# Patient Record
Sex: FEMALE | URBAN - METROPOLITAN AREA
[De-identification: names, ages, dates, MRNs, and addresses within clinical notes are randomized per-mention and may not be internally consistent; named-entity substitution may affect disease eponyms.]

---

## 2018-08-07 PROBLEM — M10.9 ACUTE GOUT INVOLVING TOE OF LEFT FOOT: Status: ACTIVE | Noted: 2018-08-07

## 2021-07-20 ENCOUNTER — HOSPITAL ENCOUNTER (OUTPATIENT)
Age: 76
Setting detail: SPECIMEN
Discharge: HOME | End: 2021-07-20
Payer: COMMERCIAL

## 2021-07-20 LAB
ANION GAP SERPL CALCULATED.3IONS-SCNC: 13 MMOL/L (ref 9–17)
BUN BLDV-MCNC: 16 MG/DL (ref 8–23)
BUN/CREAT BLD: ABNORMAL (ref 9–20)
CALCIUM SERPL-MCNC: 9.9 MG/DL (ref 8.6–10.4)
CHLORIDE BLD-SCNC: 100 MMOL/L (ref 98–107)
CO2: 24 MMOL/L (ref 20–31)
CREAT SERPL-MCNC: 0.77 MG/DL (ref 0.5–0.9)
GFR AFRICAN AMERICAN: >60 ML/MIN
GFR NON-AFRICAN AMERICAN: >60 ML/MIN
GFR SERPL CREATININE-BSD FRML MDRD: ABNORMAL ML/MIN/{1.73_M2}
GFR SERPL CREATININE-BSD FRML MDRD: ABNORMAL ML/MIN/{1.73_M2}
GLUCOSE BLD-MCNC: 134 MG/DL (ref 70–99)
HCT VFR BLD CALC: 39.8 % (ref 36.3–47.1)
HEMOGLOBIN: 12.9 G/DL (ref 11.9–15.1)
MCH RBC QN AUTO: 29.5 PG (ref 25.2–33.5)
MCHC RBC AUTO-ENTMCNC: 32.4 G/DL (ref 28.4–34.8)
MCV RBC AUTO: 90.9 FL (ref 82.6–102.9)
NRBC AUTOMATED: 0 PER 100 WBC
PDW BLD-RTO: 13.9 % (ref 11.8–14.4)
PLATELET # BLD: 267 K/UL (ref 138–453)
PMV BLD AUTO: 12.5 FL (ref 8.1–13.5)
POTASSIUM SERPL-SCNC: 3.8 MMOL/L (ref 3.7–5.3)
RBC # BLD: 4.38 M/UL (ref 3.95–5.11)
SODIUM BLD-SCNC: 137 MMOL/L (ref 135–144)
WBC # BLD: 8.5 K/UL (ref 3.5–11.3)

## 2021-07-20 PROCEDURE — 81003 URINALYSIS AUTO W/O SCOPE: CPT

## 2021-07-20 PROCEDURE — 80048 BASIC METABOLIC PNL TOTAL CA: CPT

## 2021-07-20 PROCEDURE — 85027 COMPLETE CBC AUTOMATED: CPT

## 2021-07-20 PROCEDURE — 87086 URINE CULTURE/COLONY COUNT: CPT

## 2021-07-20 PROCEDURE — P9603 ONE-WAY ALLOW PRORATED MILES: HCPCS

## 2021-07-20 PROCEDURE — 36415 COLL VENOUS BLD VENIPUNCTURE: CPT

## 2021-07-21 LAB
BILIRUBIN URINE: NEGATIVE
COLOR: YELLOW
COMMENT UA: NORMAL
GLUCOSE URINE: NEGATIVE
KETONES, URINE: NEGATIVE
LEUKOCYTE ESTERASE, URINE: NEGATIVE
NITRITE, URINE: NEGATIVE
PH UA: 6.5 (ref 5–8)
PROTEIN UA: NEGATIVE
SPECIFIC GRAVITY UA: 1.01 (ref 1–1.03)
TURBIDITY: CLEAR
URINE HGB: NEGATIVE
UROBILINOGEN, URINE: NORMAL

## 2021-07-23 LAB
CULTURE: NORMAL
Lab: NORMAL
SPECIMEN DESCRIPTION: NORMAL

## 2024-12-11 ENCOUNTER — HOSPITAL ENCOUNTER (INPATIENT)
Age: 79
LOS: 1 days | Discharge: HOME | DRG: 309 | End: 2024-12-12
Attending: EMERGENCY MEDICINE | Admitting: STUDENT IN AN ORGANIZED HEALTH CARE EDUCATION/TRAINING PROGRAM
Payer: COMMERCIAL

## 2024-12-11 ENCOUNTER — APPOINTMENT (OUTPATIENT)
Dept: GENERAL RADIOLOGY | Age: 79
DRG: 309 | End: 2024-12-11
Payer: COMMERCIAL

## 2024-12-11 DIAGNOSIS — R00.1 BRADYCARDIA: Primary | ICD-10-CM

## 2024-12-11 DIAGNOSIS — R79.89 ELEVATED TROPONIN: ICD-10-CM

## 2024-12-11 LAB
ANION GAP SERPL CALCULATED.3IONS-SCNC: 9 MMOL/L (ref 9–17)
BASOPHILS # BLD: 0.1 K/UL (ref 0–0.2)
BASOPHILS NFR BLD: 1 % (ref 0–2)
BUN SERPL-MCNC: 33 MG/DL (ref 8–23)
CALCIUM SERPL-MCNC: 9 MG/DL (ref 8.6–10.4)
CHLORIDE SERPL-SCNC: 103 MMOL/L (ref 98–107)
CO2 SERPL-SCNC: 27 MMOL/L (ref 20–31)
CREAT SERPL-MCNC: 1.8 MG/DL (ref 0.5–0.9)
EOSINOPHIL # BLD: 0.2 K/UL (ref 0–0.4)
EOSINOPHILS RELATIVE PERCENT: 4 % (ref 1–4)
ERYTHROCYTE [DISTWIDTH] IN BLOOD BY AUTOMATED COUNT: 13.5 % (ref 12.5–15.4)
GFR, ESTIMATED: 28 ML/MIN/1.73M2
GLUCOSE SERPL-MCNC: 132 MG/DL (ref 70–99)
HCT VFR BLD AUTO: 26.7 % (ref 36–46)
HGB BLD-MCNC: 8.9 G/DL (ref 12–16)
LYMPHOCYTES NFR BLD: 2.2 K/UL (ref 1–4.8)
LYMPHOCYTES RELATIVE PERCENT: 38 % (ref 24–44)
MCH RBC QN AUTO: 30.5 PG (ref 26–34)
MCHC RBC AUTO-ENTMCNC: 33.3 G/DL (ref 31–37)
MCV RBC AUTO: 91.6 FL (ref 80–100)
MONOCYTES NFR BLD: 0.6 K/UL (ref 0.1–1.2)
MONOCYTES NFR BLD: 9 % (ref 2–11)
NEUTROPHILS NFR BLD: 48 % (ref 36–66)
NEUTS SEG NFR BLD: 2.9 K/UL (ref 1.8–7.7)
PLATELET # BLD AUTO: 209 K/UL (ref 140–450)
PMV BLD AUTO: 9.6 FL (ref 6–12)
POTASSIUM SERPL-SCNC: 4.5 MMOL/L (ref 3.7–5.3)
RBC # BLD AUTO: 2.92 M/UL (ref 4–5.2)
SODIUM SERPL-SCNC: 139 MMOL/L (ref 135–144)
TROPONIN I SERPL HS-MCNC: 34 NG/L (ref 0–14)
TROPONIN I SERPL HS-MCNC: 39 NG/L (ref 0–14)
WBC OTHER # BLD: 6 K/UL (ref 3.5–11)

## 2024-12-11 PROCEDURE — 93005 ELECTROCARDIOGRAM TRACING: CPT | Performed by: NURSE PRACTITIONER

## 2024-12-11 PROCEDURE — 36415 COLL VENOUS BLD VENIPUNCTURE: CPT

## 2024-12-11 PROCEDURE — 80048 BASIC METABOLIC PNL TOTAL CA: CPT

## 2024-12-11 PROCEDURE — 85025 COMPLETE CBC W/AUTO DIFF WBC: CPT

## 2024-12-11 PROCEDURE — 2060000000 HC ICU INTERMEDIATE R&B

## 2024-12-11 PROCEDURE — 99285 EMERGENCY DEPT VISIT HI MDM: CPT

## 2024-12-11 PROCEDURE — 71045 X-RAY EXAM CHEST 1 VIEW: CPT

## 2024-12-11 PROCEDURE — 84484 ASSAY OF TROPONIN QUANT: CPT

## 2024-12-11 RX ORDER — TRAZODONE HYDROCHLORIDE 50 MG/1
TABLET, FILM COATED ORAL
COMMUNITY
Start: 2024-09-19

## 2024-12-11 RX ORDER — DONEPEZIL HYDROCHLORIDE 10 MG/1
TABLET, FILM COATED ORAL
COMMUNITY
Start: 2024-11-29

## 2024-12-11 RX ORDER — MELOXICAM 7.5 MG/1
TABLET ORAL
Status: ON HOLD | COMMUNITY
Start: 2024-11-15 | End: 2024-12-12 | Stop reason: HOSPADM

## 2024-12-11 RX ORDER — ASPIRIN 81 MG/1
81 TABLET, CHEWABLE ORAL DAILY
COMMUNITY

## 2024-12-11 RX ORDER — DIVALPROEX SODIUM 125 MG/1
TABLET, DELAYED RELEASE ORAL
COMMUNITY
Start: 2024-09-23

## 2024-12-11 RX ORDER — TRAMADOL HYDROCHLORIDE 50 MG/1
50 TABLET ORAL EVERY 6 HOURS PRN
Status: ON HOLD | COMMUNITY
End: 2024-12-12 | Stop reason: HOSPADM

## 2024-12-11 RX ORDER — ATORVASTATIN CALCIUM 40 MG/1
TABLET, FILM COATED ORAL
COMMUNITY
Start: 2024-11-27

## 2024-12-11 RX ORDER — MIRTAZAPINE 7.5 MG/1
7.5 TABLET, FILM COATED ORAL NIGHTLY
COMMUNITY

## 2024-12-11 RX ORDER — SACUBITRIL AND VALSARTAN 24; 26 MG/1; MG/1
TABLET, FILM COATED ORAL
COMMUNITY
Start: 2024-12-05

## 2024-12-11 RX ORDER — CARBIDOPA AND LEVODOPA 25; 100 MG/1; MG/1
TABLET ORAL
COMMUNITY
Start: 2024-11-18

## 2024-12-11 RX ORDER — MULTIVIT-MIN/IRON/FOLIC ACID/K 18-600-40
2000 CAPSULE ORAL DAILY
COMMUNITY

## 2024-12-11 RX ORDER — SPIRONOLACTONE 25 MG/1
25 TABLET ORAL DAILY
COMMUNITY

## 2024-12-11 RX ORDER — FUROSEMIDE 20 MG/1
TABLET ORAL
COMMUNITY
Start: 2024-12-04

## 2024-12-11 ASSESSMENT — ENCOUNTER SYMPTOMS
SINUS PAIN: 0
COUGH: 0
NAUSEA: 0
DIARRHEA: 0
VOMITING: 0
SHORTNESS OF BREATH: 0
ABDOMINAL PAIN: 0
SORE THROAT: 0

## 2024-12-11 ASSESSMENT — PAIN - FUNCTIONAL ASSESSMENT: PAIN_FUNCTIONAL_ASSESSMENT: NONE - DENIES PAIN

## 2024-12-12 ENCOUNTER — APPOINTMENT (OUTPATIENT)
Age: 79
DRG: 309 | End: 2024-12-12
Attending: STUDENT IN AN ORGANIZED HEALTH CARE EDUCATION/TRAINING PROGRAM
Payer: COMMERCIAL

## 2024-12-12 VITALS
OXYGEN SATURATION: 97 % | HEART RATE: 64 BPM | BODY MASS INDEX: 22.5 KG/M2 | RESPIRATION RATE: 17 BRPM | SYSTOLIC BLOOD PRESSURE: 131 MMHG | TEMPERATURE: 98.6 F | DIASTOLIC BLOOD PRESSURE: 52 MMHG | WEIGHT: 140 LBS | HEIGHT: 66 IN

## 2024-12-12 PROBLEM — F03.90 DEMENTIA (HCC): Status: ACTIVE | Noted: 2024-12-12

## 2024-12-12 LAB
ANION GAP SERPL CALCULATED.3IONS-SCNC: 10 MMOL/L (ref 9–17)
BUN SERPL-MCNC: 32 MG/DL (ref 8–23)
CALCIUM SERPL-MCNC: 9.2 MG/DL (ref 8.6–10.4)
CHLORIDE SERPL-SCNC: 102 MMOL/L (ref 98–107)
CHOLEST SERPL-MCNC: 134 MG/DL (ref 0–199)
CHOLESTEROL/HDL RATIO: 3.4
CO2 SERPL-SCNC: 26 MMOL/L (ref 20–31)
CREAT SERPL-MCNC: 1.6 MG/DL (ref 0.5–0.9)
ECHO AO ROOT DIAM: 2.9 CM
ECHO AO ROOT INDEX: 1.69 CM/M2
ECHO AV MEAN GRADIENT: 6 MMHG
ECHO AV MEAN VELOCITY: 1.2 M/S
ECHO AV PEAK GRADIENT: 10 MMHG
ECHO AV PEAK VELOCITY: 1.6 M/S
ECHO AV VELOCITY RATIO: 0.94
ECHO AV VTI: 40.9 CM
ECHO BSA: 1.72 M2
ECHO EST RA PRESSURE: 3 MMHG
ECHO IVC EXP: 1.1 CM
ECHO IVC INSP: 0.5 CM
ECHO LA AREA 2C: 16.8 CM2
ECHO LA AREA 4C: 18.3 CM2
ECHO LA DIAMETER INDEX: 2.73 CM/M2
ECHO LA DIAMETER: 4.7 CM
ECHO LA MAJOR AXIS: 5 CM
ECHO LA MINOR AXIS: 5.2 CM
ECHO LA TO AORTIC ROOT RATIO: 1.62
ECHO LA VOL BP: 50 ML (ref 22–52)
ECHO LA VOL MOD A2C: 44 ML (ref 22–52)
ECHO LA VOL MOD A4C: 54 ML (ref 22–52)
ECHO LA VOL/BSA BIPLANE: 29 ML/M2 (ref 16–34)
ECHO LA VOLUME INDEX MOD A2C: 26 ML/M2 (ref 16–34)
ECHO LA VOLUME INDEX MOD A4C: 31 ML/M2 (ref 16–34)
ECHO LV E' LATERAL VELOCITY: 6.31 CM/S
ECHO LV E' SEPTAL VELOCITY: 5.55 CM/S
ECHO LV EF PHYSICIAN: 65 %
ECHO LV FRACTIONAL SHORTENING: 44 % (ref 28–44)
ECHO LV INTERNAL DIMENSION DIASTOLE INDEX: 2.62 CM/M2
ECHO LV INTERNAL DIMENSION DIASTOLIC: 4.5 CM (ref 3.9–5.3)
ECHO LV INTERNAL DIMENSION SYSTOLIC INDEX: 1.45 CM/M2
ECHO LV INTERNAL DIMENSION SYSTOLIC: 2.5 CM
ECHO LV IVSD: 0.8 CM (ref 0.6–0.9)
ECHO LV MASS 2D: 113.6 G (ref 67–162)
ECHO LV MASS INDEX 2D: 66.1 G/M2 (ref 43–95)
ECHO LV POSTERIOR WALL DIASTOLIC: 0.8 CM (ref 0.6–0.9)
ECHO LV RELATIVE WALL THICKNESS RATIO: 0.36
ECHO LVOT AREA: 3.1 CM2
ECHO LVOT AV VTI INDEX: 0.79
ECHO LVOT DIAM: 2 CM
ECHO LVOT MEAN GRADIENT: 4 MMHG
ECHO LVOT PEAK GRADIENT: 9 MMHG
ECHO LVOT PEAK VELOCITY: 1.5 M/S
ECHO LVOT STROKE VOLUME INDEX: 59 ML/M2
ECHO LVOT SV: 101.4 ML
ECHO LVOT VTI: 32.3 CM
ECHO MV A VELOCITY: 0.9 M/S
ECHO MV E DECELERATION TIME (DT): 236 MS
ECHO MV E VELOCITY: 0.66 M/S
ECHO MV E/A RATIO: 0.73
ECHO MV E/E' LATERAL: 10.46
ECHO MV E/E' RATIO (AVERAGED): 11.18
ECHO MV E/E' SEPTAL: 11.89
ECHO RIGHT VENTRICULAR SYSTOLIC PRESSURE (RVSP): 31 MMHG
ECHO RV BASAL DIMENSION: 3.1 CM
ECHO RV FREE WALL PEAK S': 13.9 CM/S
ECHO TV REGURGITANT MAX VELOCITY: 2.65 M/S
ECHO TV REGURGITANT PEAK GRADIENT: 28 MMHG
EKG ATRIAL RATE: 56 BPM
EKG P AXIS: 66 DEGREES
EKG P-R INTERVAL: 148 MS
EKG Q-T INTERVAL: 456 MS
EKG QRS DURATION: 72 MS
EKG QTC CALCULATION (BAZETT): 440 MS
EKG R AXIS: 37 DEGREES
EKG T AXIS: -108 DEGREES
EKG VENTRICULAR RATE: 56 BPM
ERYTHROCYTE [DISTWIDTH] IN BLOOD BY AUTOMATED COUNT: 13.2 % (ref 12.5–15.4)
GFR, ESTIMATED: 32 ML/MIN/1.73M2
GLUCOSE SERPL-MCNC: 108 MG/DL (ref 70–99)
HCT VFR BLD AUTO: 27.2 % (ref 36–46)
HDLC SERPL-MCNC: 40 MG/DL
HGB BLD-MCNC: 8.9 G/DL (ref 12–16)
INR PPP: 0.9
LDLC SERPL CALC-MCNC: 78 MG/DL (ref 0–100)
MAGNESIUM SERPL-MCNC: 2 MG/DL (ref 1.6–2.6)
MCH RBC QN AUTO: 30.5 PG (ref 26–34)
MCHC RBC AUTO-ENTMCNC: 32.7 G/DL (ref 31–37)
MCV RBC AUTO: 93.1 FL (ref 80–100)
PLATELET # BLD AUTO: 204 K/UL (ref 140–450)
PMV BLD AUTO: 9.4 FL (ref 6–12)
POTASSIUM SERPL-SCNC: 4.7 MMOL/L (ref 3.7–5.3)
PROTHROMBIN TIME: 10.1 SEC (ref 9.4–12.6)
RBC # BLD AUTO: 2.93 M/UL (ref 4–5.2)
SODIUM SERPL-SCNC: 138 MMOL/L (ref 135–144)
TRIGL SERPL-MCNC: 82 MG/DL
TROPONIN I SERPL HS-MCNC: 34 NG/L (ref 0–14)
TROPONIN I SERPL HS-MCNC: 36 NG/L (ref 0–14)
VLDLC SERPL CALC-MCNC: 16 MG/DL (ref 1–30)
WBC OTHER # BLD: 6.4 K/UL (ref 3.5–11)

## 2024-12-12 PROCEDURE — 6360000002 HC RX W HCPCS: Performed by: STUDENT IN AN ORGANIZED HEALTH CARE EDUCATION/TRAINING PROGRAM

## 2024-12-12 PROCEDURE — 84484 ASSAY OF TROPONIN QUANT: CPT

## 2024-12-12 PROCEDURE — 80048 BASIC METABOLIC PNL TOTAL CA: CPT

## 2024-12-12 PROCEDURE — 85610 PROTHROMBIN TIME: CPT

## 2024-12-12 PROCEDURE — 83735 ASSAY OF MAGNESIUM: CPT

## 2024-12-12 PROCEDURE — 93306 TTE W/DOPPLER COMPLETE: CPT

## 2024-12-12 PROCEDURE — 2580000003 HC RX 258: Performed by: NURSE PRACTITIONER

## 2024-12-12 PROCEDURE — 85027 COMPLETE CBC AUTOMATED: CPT

## 2024-12-12 PROCEDURE — 6370000000 HC RX 637 (ALT 250 FOR IP): Performed by: NURSE PRACTITIONER

## 2024-12-12 PROCEDURE — 93010 ELECTROCARDIOGRAM REPORT: CPT | Performed by: INTERNAL MEDICINE

## 2024-12-12 PROCEDURE — 80061 LIPID PANEL: CPT

## 2024-12-12 PROCEDURE — 36415 COLL VENOUS BLD VENIPUNCTURE: CPT

## 2024-12-12 RX ORDER — ASPIRIN 81 MG/1
81 TABLET, CHEWABLE ORAL DAILY
Status: DISCONTINUED | OUTPATIENT
Start: 2024-12-12 | End: 2024-12-12 | Stop reason: HOSPADM

## 2024-12-12 RX ORDER — ONDANSETRON 4 MG/1
4 TABLET, ORALLY DISINTEGRATING ORAL EVERY 8 HOURS PRN
Status: DISCONTINUED | OUTPATIENT
Start: 2024-12-12 | End: 2024-12-12 | Stop reason: HOSPADM

## 2024-12-12 RX ORDER — ACETAMINOPHEN 325 MG/1
650 TABLET ORAL EVERY 6 HOURS PRN
Status: DISCONTINUED | OUTPATIENT
Start: 2024-12-12 | End: 2024-12-12 | Stop reason: HOSPADM

## 2024-12-12 RX ORDER — SODIUM CHLORIDE 9 MG/ML
INJECTION, SOLUTION INTRAVENOUS CONTINUOUS
Status: DISCONTINUED | OUTPATIENT
Start: 2024-12-12 | End: 2024-12-12 | Stop reason: HOSPADM

## 2024-12-12 RX ORDER — SODIUM CHLORIDE 0.9 % (FLUSH) 0.9 %
5-40 SYRINGE (ML) INJECTION EVERY 12 HOURS SCHEDULED
Status: DISCONTINUED | OUTPATIENT
Start: 2024-12-12 | End: 2024-12-12 | Stop reason: HOSPADM

## 2024-12-12 RX ORDER — THEOPHYLLINE 300 MG/1
150 TABLET, EXTENDED RELEASE ORAL 2 TIMES DAILY
DISCHARGE
Start: 2024-12-12

## 2024-12-12 RX ORDER — DONEPEZIL HYDROCHLORIDE 5 MG/1
10 TABLET, FILM COATED ORAL NIGHTLY
Status: DISCONTINUED | OUTPATIENT
Start: 2024-12-12 | End: 2024-12-12 | Stop reason: HOSPADM

## 2024-12-12 RX ORDER — THEOPHYLLINE 300 MG/1
150 TABLET, EXTENDED RELEASE ORAL EVERY 8 HOURS SCHEDULED
Status: DISCONTINUED | OUTPATIENT
Start: 2024-12-12 | End: 2024-12-12 | Stop reason: HOSPADM

## 2024-12-12 RX ORDER — ATORVASTATIN CALCIUM 40 MG/1
40 TABLET, FILM COATED ORAL NIGHTLY
Status: DISCONTINUED | OUTPATIENT
Start: 2024-12-12 | End: 2024-12-12 | Stop reason: HOSPADM

## 2024-12-12 RX ORDER — SODIUM CHLORIDE 9 MG/ML
INJECTION, SOLUTION INTRAVENOUS PRN
Status: DISCONTINUED | OUTPATIENT
Start: 2024-12-12 | End: 2024-12-12 | Stop reason: HOSPADM

## 2024-12-12 RX ORDER — ACETAMINOPHEN 650 MG/1
650 SUPPOSITORY RECTAL EVERY 6 HOURS PRN
Status: DISCONTINUED | OUTPATIENT
Start: 2024-12-12 | End: 2024-12-12 | Stop reason: HOSPADM

## 2024-12-12 RX ORDER — SODIUM CHLORIDE 0.9 % (FLUSH) 0.9 %
10 SYRINGE (ML) INJECTION PRN
Status: DISCONTINUED | OUTPATIENT
Start: 2024-12-12 | End: 2024-12-12 | Stop reason: HOSPADM

## 2024-12-12 RX ORDER — ONDANSETRON 2 MG/ML
4 INJECTION INTRAMUSCULAR; INTRAVENOUS EVERY 6 HOURS PRN
Status: DISCONTINUED | OUTPATIENT
Start: 2024-12-12 | End: 2024-12-12 | Stop reason: HOSPADM

## 2024-12-12 RX ORDER — ENOXAPARIN SODIUM 100 MG/ML
40 INJECTION SUBCUTANEOUS DAILY
Status: DISCONTINUED | OUTPATIENT
Start: 2024-12-12 | End: 2024-12-12

## 2024-12-12 RX ORDER — HEPARIN SODIUM 5000 [USP'U]/ML
5000 INJECTION, SOLUTION INTRAVENOUS; SUBCUTANEOUS EVERY 8 HOURS SCHEDULED
Status: DISCONTINUED | OUTPATIENT
Start: 2024-12-12 | End: 2024-12-12 | Stop reason: HOSPADM

## 2024-12-12 RX ORDER — MIRTAZAPINE 15 MG/1
7.5 TABLET, FILM COATED ORAL NIGHTLY
Status: DISCONTINUED | OUTPATIENT
Start: 2024-12-12 | End: 2024-12-12 | Stop reason: HOSPADM

## 2024-12-12 RX ADMIN — ASPIRIN 81 MG: 81 TABLET, CHEWABLE ORAL at 11:15

## 2024-12-12 RX ADMIN — THEOPHYLLINE 150 MG: 300 TABLET, EXTENDED RELEASE ORAL at 14:50

## 2024-12-12 RX ADMIN — SODIUM CHLORIDE: 9 INJECTION, SOLUTION INTRAVENOUS at 00:10

## 2024-12-12 RX ADMIN — HEPARIN SODIUM 5000 UNITS: 5000 INJECTION INTRAVENOUS; SUBCUTANEOUS at 00:17

## 2024-12-12 RX ADMIN — HEPARIN SODIUM 5000 UNITS: 5000 INJECTION INTRAVENOUS; SUBCUTANEOUS at 14:50

## 2024-12-12 RX ADMIN — HEPARIN SODIUM 5000 UNITS: 5000 INJECTION INTRAVENOUS; SUBCUTANEOUS at 06:03

## 2024-12-12 NOTE — DISCHARGE INSTR - COC
Continuity of Care Form    Patient Name: Jillian Britton   :  1945  MRN:  [id number]    Admit date:  2024  Discharge date:  24    Code Status Order: Full Code   Advance Directives:   Advance Care Flowsheet Documentation             Admitting Physician:  Brayden Gill MD  PCP: Alexi Montes MD    Discharging Nurse: Jared GOLDEN  Discharging Hospital Unit/Room#: 345/345-01  Discharging Unit Phone Number: [phone]  -  Emergency Contact:   Extended Emergency Contact Information  Primary Emergency Contact: Nannette Souza  Mobile Phone: [phone]  Relation: Next of Kin   needed? No    Past Surgical History:  History reviewed. No pertinent surgical history.    Immunization History:   There is no immunization history for the selected administration types on file for this patient.    Active Problems:  Patient Active Problem List   Diagnosis Code    Hyperlipidemia E78.5    Hypertension I10    Acute gout involving toe of left foot M10.9    Bradycardia R00.1    Dementia (HCC) F03.90       Isolation/Infection:   Isolation            No Isolation          Patient Infection Status       None to display            Nurse Assessment:  Last Vital Signs: BP (!) 156/60   Pulse 59   Temp 98.4 °F (36.9 °C) (Oral)   Resp 18   Wt 63.5 kg (140 lb)   SpO2 98%   BMI 22.60 kg/m²     Last documented pain score (0-10 scale):    Last Weight:   Wt Readings from Last 1 Encounters:   24 63.5 kg (140 lb)     Mental Status:  disoriented    IV Access:  - None    Nursing Mobility/ADLs:  Walking   Dependent  Transfer  Assisted  Bathing  Dependent  Dressing  Dependent  Toileting  Assisted  Feeding  Independent  Med Admin  Independent  Med Delivery   whole    Wound Care Documentation and Therapy:        Elimination:  Continence:   Bowel: No  Bladder: No  Urinary Catheter: None   Colostomy/Ileostomy/Ileal Conduit: No       Date of Last BM: 24  No intake or output data in the 24 hours ending 24  Normal vision: sees adequately in most situations; can see medication labels, newsprint

## 2024-12-12 NOTE — ED PROVIDER NOTES
[facility] Emergency Department  [address]  [facility] [address]  Phone: [phone]  Fax: [phone]        Pt Name: Jillian Britton  MRN: [id number]  Birthdate 1945  Date of evaluation: 12/11/24    CHIEFCOMPLAINT       Chief Complaint   Patient presents with    Bradycardia     Pt brought by squad from [facility] d/t unresponsiveness and HR in the low 40s  1/2 mg of atropine given PTA- HR went from 39 to 63 for squad                                  HISTORY OF PRESENT ILLNESS (Location/Symptom, Timing/Onset, Context/Setting, Quality, Duration, Modifying Factors, Severity)      Jillian Britton is a 79 y.o. female with no pertinent PMH who presents to the ED via private auto with concern for bradycardia. Pt comes to the ED from a [facility] facility after being found to have slow heart rate in the 30s. Pt denies shortness of breath, does have some slight chest pains.  History of hypertension on lisinopril, amlodipine, history of hyperlipidemia on Lipitor.    PAST MEDICAL / SURGICAL / SOCIAL / FAMILY HISTORY     PMH:  has a past medical history of Hyperlipidemia and Hypertension.  Surgical History:  has no past surgical history on file.  Social History:  reports that she has never smoked. She has never used smokeless tobacco. She reports that she does not use drugs.  Family History: has no family status information on file.    family history is not on file.  Psychiatric History: None    Allergies: Patient has no known allergies.    Home Medications:   Prior to Admission medications    Medication Sig Start Date End Date Taking? Authorizing Provider   atorvastatin (LIPITOR) 20 MG tablet TAKE ONE TABLET BY MOUTH DAILY 2/14/22   Alexi Montes MD   lisinopril (PRINIVIL;ZESTRIL) 40 MG tablet TAKE ONE TABLET BY MOUTH DAILY 8/24/21   Alexi Montes MD   allopurinol (ZYLOPRIM) 300 MG tablet TAKE 1/2 TABLET BY MOUTH DAILY 8/17/21   Alexi Montes MD   amLODIPine (NORVASC) 5

## 2024-12-12 NOTE — PLAN OF CARE
Problem: Discharge Planning  Goal: Discharge to home or other facility with appropriate resources  Outcome: Progressing  Flowsheets (Taken 12/12/2024 0000)  Discharge to home or other facility with appropriate resources:   Identify barriers to discharge with patient and caregiver   Arrange for needed discharge resources and transportation as appropriate   Identify discharge learning needs (meds, wound care, etc)     Problem: Skin/Tissue Integrity  Goal: Absence of new skin breakdown  Description: 1.  Monitor for areas of redness and/or skin breakdown  2.  Assess vascular access sites hourly  3.  Every 4-6 hours minimum:  Change oxygen saturation probe site  4.  Every 4-6 hours:  If on nasal continuous positive airway pressure, respiratory therapy assess nares and determine need for appliance change or resting period.  Outcome: Progressing

## 2024-12-12 NOTE — ED PROVIDER NOTES
Emergency Department     Faculty Attestation    I performed a history and physical examination of the patient and discussed management with the mid level provider. I reviewed the mid level provider's note and agree with the documented findings and plan of care. Any areas of disagreement are noted on the chart. I was personally present for the key portions of any procedures. I have documented in the chart those procedures where I was not present during the key portions. I have reviewed the emergency nurses triage note. I agree with the chief complaint, past medical history, past surgical history, allergies, medications, social and family history as documented unless otherwise noted below. Documentation of the HPI, Physical Exam and Medical Decision Making performed by medical students or scribes is based on my personal performance of the HPI, PE and MDM. For Physician Assistant/ Nurse Practitioner cases/documentation I have personally evaluated this patient and have completed at least one if not all key elements of the E/M (history, physical exam, and MDM). Additional findings are as noted.      Primary Care Physician:  Alexi Montes MD    CHIEF COMPLAINT       Chief Complaint   Patient presents with    Bradycardia     Pt brought by squad from [facility] d/t unresponsiveness and HR in the low 40s  1/2 mg of atropine given PTA- HR went from 39 to 63 for squad                                  RECENT VITALS:   Temp: 97.6 °F (36.4 °C),  Pulse: 64, Respirations: 18, BP: (!) 120/52    LABS:  Labs Reviewed   BASIC METABOLIC PANEL - Abnormal; Notable for the following components:       Result Value    Glucose 132 (*)     BUN 33 (*)     Creatinine 1.8 (*)     Est, Glom Filt Rate 28 (*)     All other components within normal limits   CBC WITH AUTO DIFFERENTIAL - Abnormal; Notable for the following components:    RBC 2.92 (*)     Hemoglobin 8.9 (*)     Hematocrit 26.7 (*)     All other components within

## 2024-12-12 NOTE — CARE COORDINATION
Case Management Assessment  Initial Evaluation    Date/Time of Evaluation: 12/12/2024 11:55 AM  Assessment Completed by: Ching Adam RN    If patient is discharged prior to next notation, then this note serves as note for discharge by case management.    Patient Name: Jillian Britton                   YOB: 1945  Diagnosis: Bradycardia [R00.1]  Elevated troponin [R79.89]  Symptomatic bradycardia [R00.1]                   Date / Time: 12/11/2024  7:55 PM    Patient Admission Status: Inpatient   Readmission Risk (Low < 19, Mod (19-27), High > 27): Readmission Risk Score: 13.3    Current PCP: Alexi Montes MD  PCP verified by CM? Yes    Chart Reviewed: Yes      History Provided by: (P) Patient, Medical Record, Other (see comment) (legal guardian)  Patient Orientation: Person, Place    Patient Cognition: Dementia / Early Alzheimer's    Hospitalization in the last 30 days (Readmission):  No    If yes, Readmission Assessment in  Navigator will be completed.    Advance Directives:      Code Status: Full Code   Patient's Primary Decision Maker is: Legal Next of Kin      Discharge Planning:    Patient lives with: (P) Other (Comment) ([facility]) Type of Home: (P) Long-Term Care  Primary Care Giver: Other (Comment) (nursing facility)  Patient Support Systems include: (P) Other (Comment), Friends/Neighbors (legal guardian)   Current Financial resources: (P) Medicare, Medicaid  Current community resources: (P) ECF/Home Care  Current services prior to admission: (P) Extended Care Facility            Current DME:              Type of Home Care services:  (P) None    ADLS  Prior functional level: (P) Assistance with the following:, Bathing, Dressing, Toileting, Mobility  Current functional level: (P) Assistance with the following:, Bathing, Dressing, Toileting, Mobility    PT AM-PAC:   /24  OT AM-PAC:   /24    Family can provide assistance at DC: (P) No  Would you like Case Management to discuss the

## 2024-12-12 NOTE — CONSULTS
Theophylline 150mg TID, can titrate dose as needed   BP's stable   Continue asa and statin  No further work planned at this point   No objection for discharge back to facility from CV standpoint   We will follow       Discussed with patient and Nurse.  Electronically signed by ROMAN Mott NP on 12/12/2024 at 10:03 AM      [facility]           [phone]

## 2024-12-12 NOTE — H&P
Dementia (HCC)     Heart failure (HCC)     Hyperlipidemia     Hypertension     Muscle wasting and atrophy, not elsewhere classified, multiple sites     Osteoarthritis         Past Surgical History:     History reviewed. No pertinent surgical history.     Medications Prior to Admission:     Prior to Admission medications    Medication Sig Start Date End Date Taking? Authorizing Provider   atorvastatin (LIPITOR) 40 MG tablet  11/27/24  Yes Benigno Watson MD   carbidopa-levodopa (SINEMET)  MG per tablet  11/18/24  Yes ProviderBenigno MD   divalproex (DEPAKOTE) 125 MG DR tablet  9/23/24  Yes Provider, MD Benigno   donepezil (ARICEPT) 10 MG tablet  11/29/24  Yes Provider, MD Benigno   furosemide (LASIX) 20 MG tablet  12/4/24  Yes ProviderBenigno MD   meloxicam (MOBIC) 7.5 MG tablet  11/15/24  Yes ProviderBenigno MD   ENTRESTO 24-26 MG per tablet  12/5/24  Yes Provider, MD Benigno   tiZANidine (ZANAFLEX) 4 MG tablet  11/15/24  Yes Provider, MD Benigno   traZODone (DESYREL) 50 MG tablet  9/19/24  Yes Provider, MD Benigno   aspirin 81 MG chewable tablet Take 1 tablet by mouth daily   Yes Provider, MD Benigno   mirtazapine (REMERON) 7.5 MG tablet Take 1 tablet by mouth nightly   Yes Provider, MD Benigno   spironolactone (ALDACTONE) 25 MG tablet Take 1 tablet by mouth daily   Yes Provider, MD Benigno   traMADol (ULTRAM) 50 MG tablet Take 1 tablet by mouth every 6 hours as needed for Pain. Max Daily Amount: 200 mg   Yes ProviderBenigno MD   Acetaminophen (TYLENOL 8 HOUR ARTHRITIS PAIN PO) Take by mouth   Yes Provider, MD Benigno   vitamin D (VITAMIN D, CHOLECALCIFEROL,) 50 MCG (2000 UT) CAPS capsule Take 1 capsule by mouth daily   Yes ProviderBenigno MD        Allergies:     Patient has no known allergies.    Physical Exam:   BP (!) 176/63   Pulse 65   Temp 97.7 °F (36.5 °C) (Oral)   Resp 18   Wt 63.5 kg (140 lb)   SpO2 100%   BMI 22.60 kg/m²

## 2024-12-12 NOTE — PROGRESS NOTES
Pharmacist Review and Automatic Dose Adjustment of Prophylactic Enoxaparin    Reviewed reason(s) for admission/hospital problem list    The reviewing pharmacist has made an adjustment to the ordered enoxaparin dose or converted to UFH per the approved [facility] protocol and table as identified below.        Jillian Britton is a 79 y.o. female.     Recent Labs     12/11/24 2003   CREATININE 1.8*       CrCl cannot be calculated (Unknown ideal weight.).    Recent Labs     12/11/24 2003   HGB 8.9*   HCT 26.7*        No results for input(s): \"INR\" in the last 72 hours.    Height:   Ht Readings from Last 1 Encounters:   04/18/22 1.676 m (5' 6\")     Weight:  Wt Readings from Last 1 Encounters:   12/11/24 63.5 kg (140 lb)               Plan: Based upon the patient's weight and renal function    Ordered: Enoxaparin 40mg SUBQ Daily    Changed/converted to    New Order: Heparin 5,000 units SUBQ TID      Thank you,  Julian De Leon [facility]  12/12/2024, 12:06 AM

## 2024-12-12 NOTE — DISCHARGE SUMMARY
[facility]  Office: [phone]  Zaid Sauceda DO, Bernardo Faulkner DO, Nick De La Paz DO, Fercho Brock DO, Linda Salvador MD, Rosamaria Verdin MD, Teean Puckett MD, Nga Russell MD,  Pradip Ely MD, Yvrose Elena MD, Devi Brink MD,  Olivia Hale DO, Robbin Nuñez MD, Brayden Gill MD, Calixto Sauceda DO, Jocelyne Sandoval MD,  Antione Mckenna DO, Grisel Tiwari MD, Joaquina Hughes MD, Ambreen Ernandez MD, Morgan Hrenandez MD,  Shaka Stone MD, Shannan Arshad MD, Kim Cheung MD, Junito Hendrix MD, Mihir Francis MD, Hetal Villarreal MD, Jeovanny James DO, Selvin Keating MD, Shirley Waterhouse, CNP,  Bree Mueller, CNP, Jeovanny Lepe, CNP,  Madina Hidalgo, ELMER, Shira Lipscomb, CNP, Crisitna Graves, CNP, Mary Bahena, CNP, Laura Puente, CNP, Bernie Rodriguez PA-C, Carmen Zaidi PA-C, Lexy Yuen, CNP, Daniel Spaulding, CNP,  [place], CNP, Zo Bolaños, CNP,  Irlanda Corcoran, CNP, Lizbeth Romero, CNP         [facility]   IN-PATIENT SERVICE   [facility]    Discharge Summary     Patient ID: Jillian Britton  :  1945   MRN: [id number]     ACCOUNT:  [id number]   Patient's PCP: Alexi Montes MD  Admit Date: 2024   Discharge Date: 2024  Length of Stay: 1  Code Status:  Full Code  Admitting Physician: Brayden Gill MD  Discharge Physician: Brayden Gill MD     Active Discharge Diagnoses:     Hospital Problem Lists:  Principal Problem:    Bradycardia  Active Problems:    Hyperlipidemia    Hypertension    Dementia (HCC)  Resolved Problems:    * No resolved hospital problems. *      Admission Condition:  fair     Discharged Condition: good    Hospital Stay:     Hospital Course:  Jillian Britton is an 79-year-old female with a past medical history of dementia and hyperlipidemia who presented to the emergency department on 2024 after having an episode of unresponsiveness at her nursing facility. The patient was noted to be bradycardic with a HR in

## 2024-12-12 NOTE — DISCHARGE INSTR - DIET

## 2024-12-12 NOTE — PLAN OF CARE
Problem: Discharge Planning  Goal: Discharge to home or other facility with appropriate resources  12/12/2024 1155 by Jared Bobo, RN  Outcome: Completed  Flowsheets (Taken 12/12/2024 0800)  Discharge to home or other facility with appropriate resources:   Identify barriers to discharge with patient and caregiver   Arrange for needed discharge resources and transportation as appropriate   Identify discharge learning needs (meds, wound care, etc)     Problem: Skin/Tissue Integrity  Goal: Absence of new skin breakdown  Description: 1.  Monitor for areas of redness and/or skin breakdown  2.  Assess vascular access sites hourly  3.  Every 4-6 hours minimum:  Change oxygen saturation probe site  4.  Every 4-6 hours:  If on nasal continuous positive airway pressure, respiratory therapy assess nares and determine need for appliance change or resting period.  12/12/2024 1155 by Jared Bobo, RN  Outcome: Completed     Problem: Safety - Adult  Goal: Free from fall injury  Outcome: Completed

## 2024-12-12 NOTE — ED NOTES
ED to inpatient nurses report      Chief Complaint:  Chief Complaint   Patient presents with    Bradycardia     Pt brought by squad from [facility] d/t unresponsiveness and HR in the low 40s  1/2 mg of atropine given PTA- HR went from 39 to 63 for squad                                Present to ED from: from [facility]    MOA:     LOC: alert and orientated to name and place  Mobility: Fully dependent  Oxygen Baseline: none    Current needs required: none   Pending ED orders: none  Present condition: stable    Why did the patient come to the ED? Pt was brought to ED d/t bradycardia and being unresponsive. Nurse at facility stated pt was eating dinner, wheeled herself back to her room, was in a doorway unresponsive in . That was when HR was noted to be in the low 40s. Squad did admin 1/2 mg of atropine pta. For squad HR went from 39-63.  What is the plan? Admit, cardiology consult  Any procedures or intervention occur? none  Any safety concerns??fall risk  CODE STATUS No Order  Diet No diet orders on file    Mental Status:  Level of Consciousness: Alert (0)    Psych Assessment:   Psychosocial  Psychosocial (WDL): Within Defined Limits  Vital signs   Vitals:    12/11/24 2015 12/11/24 2047 12/11/24 2130 12/11/24 2215   BP: (!) 104/46 101/60 (!) 114/54 (!) 103/49   Pulse: (!) 49 (!) 47 58 56   Resp: 13  13    Temp:       TempSrc:       SpO2: 98% 99% 99% 98%   Weight:            Vitals:  Patient Vitals for the past 24 hrs:   BP Temp Temp src Pulse Resp SpO2 Weight   12/11/24 2215 (!) 103/49 -- -- 56 -- 98 % --   12/11/24 2130 (!) 114/54 -- -- 58 13 99 % --   12/11/24 2047 101/60 -- -- (!) 47 -- 99 % --   12/11/24 2015 (!) 104/46 -- -- (!) 49 13 98 % --   12/11/24 1956 (!) 106/49 97.6 °F (36.4 °C) Oral 58 14 96 % 63.5 kg (140 lb)      Visit Vitals  BP (!) 103/49   Pulse 56   Temp 97.6 °F (36.4 °C) (Oral)   Resp 13   Wt 63.5 kg (140 lb)   SpO2 98%   BMI 22.60 kg/m²        LDAs:   Peripheral IV

## 2024-12-12 NOTE — PROGRESS NOTES
Spiritual Health History and Assessment/Progress Note  [facility]    (P) Initial Encounter,  ,  ,      Name: Jillian Britton MRN: [id number]    Age: 79 y.o.     Sex: female   Language: English   Mu-ism: Shinto   Symptomatic bradycardia     Date: 12/12/2024            Total Time Calculated: (P) 12 min              Spiritual Assessment began in [address]        Referral/Consult From: (P) Rounding   Encounter Overview/Reason: (P) Initial Encounter  Service Provided For: (P) Patient    PT appears to be cognitively impaired.  PT shared that she lives with her mother.   assumed that is not true.  PT was receptive to visit.   offered companionship and prayer.    Stefanie, Belief, Meaning:   Patient identifies as spiritual and has beliefs or practices that help with coping during difficult times  Family/Friends No family/friends present      Importance and Influence:  Patient has spiritual/personal beliefs that influence decisions regarding their health  Family/Friends No family/friends present    Community:  Patient expresses feelings of isolation: disconnected from family/friends  Family/Friends No family/friends present    Assessment and Plan of Care:     Patient Interventions include: Facilitated expression of thoughts and feelings  Family/Friends Interventions include: No family/friends present    Patient Plan of Care: Spiritual Care available upon further referral  Family/Friends Plan of Care: No family/friends present    Electronically signed by Chaplain Clare Intern on 12/12/2024 at 11:28 AM

## 2024-12-13 NOTE — PROGRESS NOTES
Discharge instructions reviewed. Personal belongings returned to patient. Tele removed from room and returned to med room. Brief changed. Left via [facility]. ANDREA @[phone]. Writer attempted to call [employer]. No answer.

## 2025-02-25 ENCOUNTER — HOSPITAL ENCOUNTER (INPATIENT)
Age: 80
LOS: 6 days | Discharge: NURSING, REHAB OR LONG-TERM CARE | DRG: 312 | End: 2025-03-03
Attending: STUDENT IN AN ORGANIZED HEALTH CARE EDUCATION/TRAINING PROGRAM | Admitting: INTERNAL MEDICINE
Payer: MEDICARE

## 2025-02-25 ENCOUNTER — APPOINTMENT (OUTPATIENT)
Dept: GENERAL RADIOLOGY | Age: 80
DRG: 312 | End: 2025-02-25
Payer: MEDICARE

## 2025-02-25 ENCOUNTER — APPOINTMENT (OUTPATIENT)
Dept: CT IMAGING | Age: 80
DRG: 312 | End: 2025-02-25
Payer: MEDICARE

## 2025-02-25 DIAGNOSIS — R79.89 ELEVATED TROPONIN: ICD-10-CM

## 2025-02-25 DIAGNOSIS — R55 SYNCOPE, UNSPECIFIED SYNCOPE TYPE: Primary | ICD-10-CM

## 2025-02-25 DIAGNOSIS — R55 VASOVAGAL SYNCOPE: ICD-10-CM

## 2025-02-25 LAB
ALBUMIN SERPL-MCNC: 3.4 G/DL (ref 3.5–5.2)
ALBUMIN/GLOB SERPL: 1.4 {RATIO} (ref 1–2.5)
ALP SERPL-CCNC: 68 U/L (ref 35–104)
ALT SERPL-CCNC: <5 U/L (ref 5–33)
ANION GAP SERPL CALCULATED.3IONS-SCNC: 11 MMOL/L (ref 9–17)
AST SERPL-CCNC: 11 U/L
BASOPHILS # BLD: 0.1 K/UL (ref 0–0.2)
BASOPHILS NFR BLD: 2 % (ref 0–2)
BILIRUB SERPL-MCNC: 0.2 MG/DL (ref 0.3–1.2)
BUN SERPL-MCNC: 13 MG/DL (ref 8–23)
CALCIUM SERPL-MCNC: 9.4 MG/DL (ref 8.6–10.4)
CHLORIDE SERPL-SCNC: 104 MMOL/L (ref 98–107)
CO2 SERPL-SCNC: 25 MMOL/L (ref 20–31)
CREAT SERPL-MCNC: 1.3 MG/DL (ref 0.5–0.9)
EOSINOPHIL # BLD: 0.1 K/UL (ref 0–0.4)
EOSINOPHILS RELATIVE PERCENT: 2 % (ref 1–4)
ERYTHROCYTE [DISTWIDTH] IN BLOOD BY AUTOMATED COUNT: 13.7 % (ref 12.5–15.4)
GFR, ESTIMATED: 42 ML/MIN/1.73M2
GLUCOSE SERPL-MCNC: 130 MG/DL (ref 70–99)
HCT VFR BLD AUTO: 27.9 % (ref 36–46)
HGB BLD-MCNC: 9.6 G/DL (ref 12–16)
LYMPHOCYTES NFR BLD: 1.5 K/UL (ref 1–4.8)
LYMPHOCYTES RELATIVE PERCENT: 24 % (ref 24–44)
MCH RBC QN AUTO: 30.1 PG (ref 26–34)
MCHC RBC AUTO-ENTMCNC: 34.4 G/DL (ref 31–37)
MCV RBC AUTO: 87.3 FL (ref 80–100)
MONOCYTES NFR BLD: 0.8 K/UL (ref 0.1–1.2)
MONOCYTES NFR BLD: 12 % (ref 2–11)
NEUTROPHILS NFR BLD: 60 % (ref 36–66)
NEUTS SEG NFR BLD: 3.9 K/UL (ref 1.8–7.7)
PLATELET # BLD AUTO: 210 K/UL (ref 140–450)
PMV BLD AUTO: 9.5 FL (ref 6–12)
POTASSIUM SERPL-SCNC: 4 MMOL/L (ref 3.7–5.3)
PROT SERPL-MCNC: 5.9 G/DL (ref 6.4–8.3)
RBC # BLD AUTO: 3.19 M/UL (ref 4–5.2)
SODIUM SERPL-SCNC: 140 MMOL/L (ref 135–144)
TROPONIN I SERPL HS-MCNC: 46 NG/L (ref 0–14)
TROPONIN I SERPL HS-MCNC: 61 NG/L (ref 0–14)
WBC OTHER # BLD: 6.4 K/UL (ref 3.5–11)

## 2025-02-25 PROCEDURE — 2580000003 HC RX 258: Performed by: STUDENT IN AN ORGANIZED HEALTH CARE EDUCATION/TRAINING PROGRAM

## 2025-02-25 PROCEDURE — 1200000000 HC SEMI PRIVATE

## 2025-02-25 PROCEDURE — 99222 1ST HOSP IP/OBS MODERATE 55: CPT

## 2025-02-25 PROCEDURE — 99285 EMERGENCY DEPT VISIT HI MDM: CPT

## 2025-02-25 PROCEDURE — 83880 ASSAY OF NATRIURETIC PEPTIDE: CPT

## 2025-02-25 PROCEDURE — 80053 COMPREHEN METABOLIC PANEL: CPT

## 2025-02-25 PROCEDURE — 93005 ELECTROCARDIOGRAM TRACING: CPT | Performed by: STUDENT IN AN ORGANIZED HEALTH CARE EDUCATION/TRAINING PROGRAM

## 2025-02-25 PROCEDURE — 70450 CT HEAD/BRAIN W/O DYE: CPT

## 2025-02-25 PROCEDURE — 71045 X-RAY EXAM CHEST 1 VIEW: CPT

## 2025-02-25 PROCEDURE — 85025 COMPLETE CBC W/AUTO DIFF WBC: CPT

## 2025-02-25 PROCEDURE — 36415 COLL VENOUS BLD VENIPUNCTURE: CPT

## 2025-02-25 PROCEDURE — 84484 ASSAY OF TROPONIN QUANT: CPT

## 2025-02-25 RX ORDER — DONEPEZIL HYDROCHLORIDE 5 MG/1
10 TABLET, FILM COATED ORAL NIGHTLY
Status: DISCONTINUED | OUTPATIENT
Start: 2025-02-26 | End: 2025-03-03 | Stop reason: HOSPADM

## 2025-02-25 RX ORDER — ATORVASTATIN CALCIUM 40 MG/1
40 TABLET, FILM COATED ORAL DAILY
Status: DISCONTINUED | OUTPATIENT
Start: 2025-02-25 | End: 2025-03-03 | Stop reason: HOSPADM

## 2025-02-25 RX ORDER — CARBIDOPA AND LEVODOPA 25; 100 MG/1; MG/1
0.5 TABLET ORAL 2 TIMES DAILY
Status: DISCONTINUED | OUTPATIENT
Start: 2025-02-26 | End: 2025-02-26

## 2025-02-25 RX ORDER — THEOPHYLLINE 300 MG/1
150 TABLET, EXTENDED RELEASE ORAL DAILY
Status: DISCONTINUED | OUTPATIENT
Start: 2025-02-26 | End: 2025-02-28

## 2025-02-25 RX ORDER — SODIUM CHLORIDE 0.9 % (FLUSH) 0.9 %
5-40 SYRINGE (ML) INJECTION EVERY 12 HOURS SCHEDULED
Status: DISCONTINUED | OUTPATIENT
Start: 2025-02-26 | End: 2025-03-03 | Stop reason: HOSPADM

## 2025-02-25 RX ORDER — SODIUM CHLORIDE 9 MG/ML
INJECTION, SOLUTION INTRAVENOUS CONTINUOUS
Status: DISCONTINUED | OUTPATIENT
Start: 2025-02-26 | End: 2025-02-28

## 2025-02-25 RX ORDER — ACETAMINOPHEN 325 MG/1
650 TABLET ORAL EVERY 6 HOURS PRN
Status: DISCONTINUED | OUTPATIENT
Start: 2025-02-25 | End: 2025-03-03 | Stop reason: HOSPADM

## 2025-02-25 RX ORDER — SODIUM CHLORIDE 0.9 % (FLUSH) 0.9 %
10 SYRINGE (ML) INJECTION PRN
Status: DISCONTINUED | OUTPATIENT
Start: 2025-02-25 | End: 2025-03-03 | Stop reason: HOSPADM

## 2025-02-25 RX ORDER — 0.9 % SODIUM CHLORIDE 0.9 %
1000 INTRAVENOUS SOLUTION INTRAVENOUS ONCE
Status: COMPLETED | OUTPATIENT
Start: 2025-02-25 | End: 2025-02-25

## 2025-02-25 RX ORDER — MAGNESIUM SULFATE 1 G/100ML
1000 INJECTION INTRAVENOUS PRN
Status: DISCONTINUED | OUTPATIENT
Start: 2025-02-25 | End: 2025-03-03 | Stop reason: HOSPADM

## 2025-02-25 RX ORDER — POTASSIUM CHLORIDE 7.45 MG/ML
10 INJECTION INTRAVENOUS PRN
Status: DISCONTINUED | OUTPATIENT
Start: 2025-02-25 | End: 2025-03-03 | Stop reason: HOSPADM

## 2025-02-25 RX ORDER — SODIUM CHLORIDE 9 MG/ML
INJECTION, SOLUTION INTRAVENOUS PRN
Status: DISCONTINUED | OUTPATIENT
Start: 2025-02-25 | End: 2025-03-03 | Stop reason: HOSPADM

## 2025-02-25 RX ORDER — ONDANSETRON 4 MG/1
4 TABLET, ORALLY DISINTEGRATING ORAL EVERY 8 HOURS PRN
Status: DISCONTINUED | OUTPATIENT
Start: 2025-02-25 | End: 2025-03-03 | Stop reason: HOSPADM

## 2025-02-25 RX ORDER — DIVALPROEX SODIUM 125 MG/1
125 TABLET, DELAYED RELEASE ORAL EVERY 12 HOURS SCHEDULED
Status: DISCONTINUED | OUTPATIENT
Start: 2025-02-26 | End: 2025-03-01

## 2025-02-25 RX ORDER — SACUBITRIL AND VALSARTAN 24; 26 MG/1; MG/1
1 TABLET, FILM COATED ORAL 2 TIMES DAILY
Status: DISCONTINUED | OUTPATIENT
Start: 2025-02-26 | End: 2025-02-26

## 2025-02-25 RX ORDER — ACETAMINOPHEN 650 MG/1
650 SUPPOSITORY RECTAL EVERY 6 HOURS PRN
Status: DISCONTINUED | OUTPATIENT
Start: 2025-02-25 | End: 2025-03-03 | Stop reason: HOSPADM

## 2025-02-25 RX ORDER — POTASSIUM CHLORIDE 1500 MG/1
40 TABLET, EXTENDED RELEASE ORAL PRN
Status: DISCONTINUED | OUTPATIENT
Start: 2025-02-25 | End: 2025-03-03 | Stop reason: HOSPADM

## 2025-02-25 RX ORDER — POLYETHYLENE GLYCOL 3350 17 G/17G
17 POWDER, FOR SOLUTION ORAL DAILY PRN
Status: DISCONTINUED | OUTPATIENT
Start: 2025-02-25 | End: 2025-03-03 | Stop reason: HOSPADM

## 2025-02-25 RX ORDER — ASPIRIN 81 MG/1
81 TABLET, CHEWABLE ORAL DAILY
Status: DISCONTINUED | OUTPATIENT
Start: 2025-02-26 | End: 2025-03-03 | Stop reason: HOSPADM

## 2025-02-25 RX ORDER — ONDANSETRON 2 MG/ML
4 INJECTION INTRAMUSCULAR; INTRAVENOUS EVERY 6 HOURS PRN
Status: DISCONTINUED | OUTPATIENT
Start: 2025-02-25 | End: 2025-03-03 | Stop reason: HOSPADM

## 2025-02-25 RX ORDER — ENOXAPARIN SODIUM 100 MG/ML
40 INJECTION SUBCUTANEOUS DAILY
Status: DISCONTINUED | OUTPATIENT
Start: 2025-02-26 | End: 2025-03-03 | Stop reason: HOSPADM

## 2025-02-25 RX ORDER — MIRTAZAPINE 15 MG/1
7.5 TABLET, FILM COATED ORAL NIGHTLY
Status: DISCONTINUED | OUTPATIENT
Start: 2025-02-26 | End: 2025-03-03 | Stop reason: HOSPADM

## 2025-02-25 RX ADMIN — SODIUM CHLORIDE 1000 ML: 0.9 INJECTION, SOLUTION INTRAVENOUS at 19:49

## 2025-02-25 ASSESSMENT — PAIN - FUNCTIONAL ASSESSMENT: PAIN_FUNCTIONAL_ASSESSMENT: NONE - DENIES PAIN

## 2025-02-26 PROBLEM — I50.32 CHRONIC DIASTOLIC HEART FAILURE (HCC): Status: ACTIVE | Noted: 2025-02-26

## 2025-02-26 LAB
ALBUMIN SERPL-MCNC: 3.1 G/DL (ref 3.5–5.2)
ALBUMIN/GLOB SERPL: 1.4 {RATIO} (ref 1–2.5)
ALP SERPL-CCNC: 52 U/L (ref 35–104)
ALT SERPL-CCNC: <5 U/L (ref 5–33)
ANION GAP SERPL CALCULATED.3IONS-SCNC: 9 MMOL/L (ref 9–17)
AST SERPL-CCNC: 9 U/L
BACTERIA URNS QL MICRO: ABNORMAL
BILIRUB SERPL-MCNC: 0.2 MG/DL (ref 0.3–1.2)
BILIRUB UR QL STRIP: NEGATIVE
BNP SERPL-MCNC: 3656 PG/ML
BUN SERPL-MCNC: 12 MG/DL (ref 8–23)
CALCIUM SERPL-MCNC: 9.1 MG/DL (ref 8.6–10.4)
CHARACTER UR: ABNORMAL
CHLORIDE SERPL-SCNC: 107 MMOL/L (ref 98–107)
CLARITY UR: CLEAR
CO2 SERPL-SCNC: 24 MMOL/L (ref 20–31)
COLOR UR: YELLOW
CREAT SERPL-MCNC: 1.1 MG/DL (ref 0.5–0.9)
EKG ATRIAL RATE: 69 BPM
EKG P AXIS: 61 DEGREES
EKG P-R INTERVAL: 132 MS
EKG Q-T INTERVAL: 438 MS
EKG QRS DURATION: 72 MS
EKG QTC CALCULATION (BAZETT): 469 MS
EKG R AXIS: -2 DEGREES
EKG T AXIS: 148 DEGREES
EKG VENTRICULAR RATE: 69 BPM
EPI CELLS #/AREA URNS HPF: ABNORMAL /HPF (ref 0–5)
GFR, ESTIMATED: 51 ML/MIN/1.73M2
GLUCOSE SERPL-MCNC: 101 MG/DL (ref 70–99)
GLUCOSE UR STRIP-MCNC: NEGATIVE MG/DL
HGB UR QL STRIP.AUTO: NEGATIVE
KETONES UR STRIP-MCNC: NEGATIVE MG/DL
LEUKOCYTE ESTERASE UR QL STRIP: ABNORMAL
NITRITE UR QL STRIP: NEGATIVE
PH UR STRIP: 6 [PH] (ref 5–8)
POTASSIUM SERPL-SCNC: 3.8 MMOL/L (ref 3.7–5.3)
PROT SERPL-MCNC: 5.3 G/DL (ref 6.4–8.3)
PROT UR STRIP-MCNC: NEGATIVE MG/DL
RBC #/AREA URNS HPF: ABNORMAL /HPF (ref 0–2)
SODIUM SERPL-SCNC: 140 MMOL/L (ref 135–144)
SP GR UR STRIP: 1.02 (ref 1–1.03)
TROPONIN I SERPL HS-MCNC: 47 NG/L (ref 0–14)
TROPONIN I SERPL HS-MCNC: 48 NG/L (ref 0–14)
UROBILINOGEN UR STRIP-ACNC: NORMAL EU/DL (ref 0–1)
WBC #/AREA URNS HPF: ABNORMAL /HPF (ref 0–5)

## 2025-02-26 PROCEDURE — 84484 ASSAY OF TROPONIN QUANT: CPT

## 2025-02-26 PROCEDURE — 80198 ASSAY OF THEOPHYLLINE: CPT

## 2025-02-26 PROCEDURE — 6370000000 HC RX 637 (ALT 250 FOR IP)

## 2025-02-26 PROCEDURE — 97162 PT EVAL MOD COMPLEX 30 MIN: CPT

## 2025-02-26 PROCEDURE — 36415 COLL VENOUS BLD VENIPUNCTURE: CPT

## 2025-02-26 PROCEDURE — 97166 OT EVAL MOD COMPLEX 45 MIN: CPT

## 2025-02-26 PROCEDURE — 6360000002 HC RX W HCPCS

## 2025-02-26 PROCEDURE — 81001 URINALYSIS AUTO W/SCOPE: CPT

## 2025-02-26 PROCEDURE — 2580000003 HC RX 258

## 2025-02-26 PROCEDURE — 1200000000 HC SEMI PRIVATE

## 2025-02-26 PROCEDURE — 97535 SELF CARE MNGMENT TRAINING: CPT

## 2025-02-26 PROCEDURE — 80053 COMPREHEN METABOLIC PANEL: CPT

## 2025-02-26 PROCEDURE — 99232 SBSQ HOSP IP/OBS MODERATE 35: CPT | Performed by: INTERNAL MEDICINE

## 2025-02-26 PROCEDURE — 93010 ELECTROCARDIOGRAM REPORT: CPT | Performed by: INTERNAL MEDICINE

## 2025-02-26 PROCEDURE — 97116 GAIT TRAINING THERAPY: CPT

## 2025-02-26 PROCEDURE — 97530 THERAPEUTIC ACTIVITIES: CPT

## 2025-02-26 PROCEDURE — 51798 US URINE CAPACITY MEASURE: CPT

## 2025-02-26 PROCEDURE — 99223 1ST HOSP IP/OBS HIGH 75: CPT | Performed by: SURGERY

## 2025-02-26 RX ORDER — ALPRAZOLAM 0.25 MG
0.5 TABLET ORAL ONCE
Status: DISCONTINUED | OUTPATIENT
Start: 2025-02-26 | End: 2025-02-26

## 2025-02-26 RX ORDER — NAPROXEN SODIUM 550 MG/1
550 TABLET ORAL 2 TIMES DAILY WITH MEALS
Status: ON HOLD | COMMUNITY
End: 2025-03-03 | Stop reason: HOSPADM

## 2025-02-26 RX ORDER — PANTOPRAZOLE SODIUM 40 MG/1
40 TABLET, DELAYED RELEASE ORAL DAILY
Status: CANCELLED | OUTPATIENT
Start: 2025-02-26

## 2025-02-26 RX ORDER — PANTOPRAZOLE SODIUM 40 MG/1
40 TABLET, DELAYED RELEASE ORAL DAILY
COMMUNITY

## 2025-02-26 RX ORDER — CARBIDOPA AND LEVODOPA 25; 100 MG/1; MG/1
1 TABLET ORAL 3 TIMES DAILY
Status: DISCONTINUED | OUTPATIENT
Start: 2025-02-26 | End: 2025-03-03 | Stop reason: HOSPADM

## 2025-02-26 RX ORDER — THEOPHYLLINE 400 MG/1
400 TABLET, EXTENDED RELEASE ORAL DAILY
Status: ON HOLD | COMMUNITY
End: 2025-03-03 | Stop reason: HOSPADM

## 2025-02-26 RX ORDER — DIVALPROEX SODIUM 125 MG/1
125 CAPSULE, COATED PELLETS ORAL NIGHTLY
Status: ON HOLD | COMMUNITY
End: 2025-03-03

## 2025-02-26 RX ORDER — DIVALPROEX SODIUM 125 MG/1
250 TABLET, DELAYED RELEASE ORAL NIGHTLY
Status: ON HOLD | COMMUNITY
End: 2025-03-03 | Stop reason: HOSPADM

## 2025-02-26 RX ADMIN — DIVALPROEX SODIUM 125 MG: 125 TABLET, DELAYED RELEASE ORAL at 21:43

## 2025-02-26 RX ADMIN — DIVALPROEX SODIUM 125 MG: 125 TABLET, DELAYED RELEASE ORAL at 10:33

## 2025-02-26 RX ADMIN — THEOPHYLLINE 150 MG: 300 TABLET, EXTENDED RELEASE ORAL at 10:32

## 2025-02-26 RX ADMIN — ASPIRIN 81 MG 81 MG: 81 TABLET ORAL at 10:34

## 2025-02-26 RX ADMIN — CARBIDOPA AND LEVODOPA 1 TABLET: 25; 100 TABLET ORAL at 10:33

## 2025-02-26 RX ADMIN — CARBIDOPA AND LEVODOPA 1 TABLET: 25; 100 TABLET ORAL at 14:41

## 2025-02-26 RX ADMIN — CARBIDOPA AND LEVODOPA 1 TABLET: 25; 100 TABLET ORAL at 21:42

## 2025-02-26 RX ADMIN — ENOXAPARIN SODIUM 40 MG: 100 INJECTION SUBCUTANEOUS at 10:34

## 2025-02-26 RX ADMIN — ATORVASTATIN CALCIUM 40 MG: 40 TABLET, FILM COATED ORAL at 21:42

## 2025-02-26 RX ADMIN — SODIUM CHLORIDE: 0.9 INJECTION, SOLUTION INTRAVENOUS at 00:09

## 2025-02-26 RX ADMIN — DONEPEZIL HYDROCHLORIDE 10 MG: 5 TABLET, FILM COATED ORAL at 21:43

## 2025-02-26 RX ADMIN — MIRTAZAPINE 7.5 MG: 15 TABLET, FILM COATED ORAL at 21:43

## 2025-02-26 NOTE — CONSULTS
Mild regurgitation.    Mitral Valve: Mild annular calcification at the posterior leaflet.    Tricuspid Valve: Normal RVSP. The estimated RVSP is 31 mmHg.    Left Atrium: Left atrium is mildly dilated. Left atrial volume index is normal (16-34 mL/m2). LA Vol Index is  29 ml/m2.    Image quality is adequate. Technically difficult study.     Labs:     CBC:   Recent Labs     02/25/25 1955   WBC 6.4   HGB 9.6*   HCT 27.9*        BMP:   Recent Labs     02/25/25 1955 02/26/25 0205    140   K 4.0 3.8   CO2 25 24   BUN 13 12   CREATININE 1.3* 1.1*   LABGLOM 42* 51*   GLUCOSE 130* 101*     BNP: No results for input(s): \"BNP\" in the last 72 hours.  PT/INR: No results for input(s): \"PROTIME\", \"INR\" in the last 72 hours.  APTT:No results for input(s): \"APTT\" in the last 72 hours.  CARDIAC ENZYMES:No results for input(s): \"CKTOTAL\", \"CKMB\", \"CKMBINDEX\", \"TROPONINI\" in the last 72 hours.  FASTING LIPID PANEL:  Lab Results   Component Value Date/Time    HDL 40 12/12/2024 02:02 AM    TRIG 82 12/12/2024 02:02 AM     LIVER PROFILE:  Recent Labs     02/25/25 1955 02/26/25 0205   AST 11 9   ALT <5* <5*       IMPRESSION:    Patient Active Problem List   Diagnosis    Hyperlipidemia    Hypertension    Acute gout involving toe of left foot    Bradycardia    Dementia (HCC)    Syncope, unspecified syncope type    Chronic diastolic heart failure (HCC)       CV active problem list  Syncope  Bradycardia  Chronic diastolic CHF  Hypertension  Hyperlipidemia  Dementia    RECOMMENDATIONS:  Apply telemetry, check theophylline level  Continue aspirin statin  Agree with holding antihypertensive medication for now  Obtain orthostatics blood pressure  Repeat echo       Discussed with patient and Nurse.  Electronically signed by ROMAN Barr NP on 2/26/2025 at 8:56 AM      [facility]           [phone]

## 2025-02-26 NOTE — ED NOTES
blood pressure in the 50s, heart rate in the 70s.  Per facility this has been happening the patient frequently, today was approximately the fourth episode, has not yet been evaluated for them. [AB]   2036 WBC: 6.4 [AB]   2036 Hemoglobin Quant(!): 9.6 [AB]   2036 Creatinine(!): 1.3 [AB]   2036 Troponin, High Sensitivity(!!): 61  Elevated, will trend [AB]   2114 CT HEAD WO CONTRAST  IMPRESSION:  No acute intracranial abnormality.   [AB]   2114 XR CHEST PORTABLE  IMPRESSION:  1. No acute cardiopulmonary process.  2. Vague small opacity, possibly nodular, left upper lung. Recommend  follow-up chest x-ray in 3 months.      [AB]   2205 Troponin, High Sensitivity(!): 46  Improved [AB]   2209 Hospitalist consulted for admission due to elevated troponin in the setting of syncope, discussed case with hospitalist, accepted patient. [AB]      ED Course User Index  [AB] Stephany Dykes DO        Electronically signed by Cristina Espinoza RN on 2/25/2025 at 10:34 PM

## 2025-02-26 NOTE — ED PROVIDER NOTES
[address]  [address] [facility] [address]  Phone: [phone]        [address]  EMERGENCY DEPARTMENT ENCOUNTER      Pt Name: Jillian Britton  MRN: [id number]  Birthdate 1945  Date of evaluation: 2/25/2025  Provider: Stephany Dykes DO    CHIEF COMPLAINT       Chief Complaint   Patient presents with    Altered Mental Status     Facility reports brief loc and low blood presssure       HISTORY OF PRESENT ILLNESS   (Location/Symptom, Timing/Onset,Context/Setting, Quality, Duration, Modifying Factors, Severity)  Note limiting factors.     Jillian Britton is a 79 y.o. female who presents to the emergency department with EMS from nursing facility.  Patient lives in a memory care unit.  Patient reportedly had some episode today after dinner where she became unresponsive, details surrounding this are somewhat unclear.  Reportedly during the episode patient's blood pressure was checked and it was very low.  Patient has dementia, unable to provide any history herself.  Patient has no complaints on arrival here.     Nursing Notes were reviewed.    REVIEW OF SYSTEMS       Review of Systems   Unable to perform ROS: Dementia       PAST MEDICAL HISTORY     Past Medical History:   Diagnosis Date    Dementia (HCC)     Heart failure (HCC)     Hyperlipidemia     Hypertension     Muscle wasting and atrophy, not elsewhere classified, multiple sites     Osteoarthritis        SURGICAL HISTORY       No past surgical history on file.    CURRENT MEDICATIONS     Current Discharge Medication List        CONTINUE these medications which have NOT CHANGED    Details   divalproex (DEPAKOTE SPRINKLE) 125 MG DR capsule Take 1 capsule by mouth nightly      theophylline (UNIPHYL) 400 MG extended release tablet Take 1 tablet by mouth daily      theophylline (THEODUR) 300 MG extended release tablet Take 0.5 tablets by mouth daily  Qty: 30 tablet, Refills: 0      pantoprazole (PROTONIX) 40 MG tablet Take 1 tablet by mouth

## 2025-02-26 NOTE — H&P
(Principal) Syncope, unspecified syncope type 2/25/2025 Yes    Hyperlipidemia 2/25/2025 Yes    Hypertension 2/25/2025 Yes    Bradycardia 2/26/2025 Yes    Dementia (HCC) 2/25/2025 Yes    Chronic diastolic heart failure (HCC) 2/26/2025 Yes       Plan:     Patient status Inpatient in the Med/Surge    Syncope  Fall precaution  Echocardiogram  CT head showed no acute intracranial abnormality  Consult cardiology  Hyperlipidemia  Resume home atorvastatin  Hypertension   Currently holding spironolactone and Lasix for now determine what dosage and medication patient takes at home.  Currently normotensive  history of bradycardia  We will continue Theophylline  Dementia  Continue home Aricept and Remeron  Heart failure  Continue Entresto  We will continue gentle IV hydration as patient looks dry    Consultations:   IP CONSULT TO INTERNAL MEDICINE  IP CONSULT TO SOCIAL WORK  IP CONSULT TO CARDIOLOGY    Patient is admitted as inpatient status because of comorbidities listed above, severity of signs and symptoms as outlined, required meant for current medical therapies and most importantly because of direct risk to patient if care not provided in a hospital setting.  Expected length of stay greater than 48 hours.    ROMAN Wheeler - CNP  2/26/2025  1:43 AM    Copy sent to Alexi Francisco MD

## 2025-02-27 ENCOUNTER — APPOINTMENT (OUTPATIENT)
Age: 80
DRG: 312 | End: 2025-02-27
Payer: MEDICARE

## 2025-02-27 ENCOUNTER — APPOINTMENT (OUTPATIENT)
Dept: VASCULAR LAB | Age: 80
DRG: 312 | End: 2025-02-27
Attending: INTERNAL MEDICINE
Payer: MEDICARE

## 2025-02-27 LAB
ANION GAP SERPL CALCULATED.3IONS-SCNC: 10 MMOL/L (ref 9–17)
BUN SERPL-MCNC: 6 MG/DL (ref 8–23)
CALCIUM SERPL-MCNC: 9.5 MG/DL (ref 8.6–10.4)
CHLORIDE SERPL-SCNC: 108 MMOL/L (ref 98–107)
CO2 SERPL-SCNC: 25 MMOL/L (ref 20–31)
CREAT SERPL-MCNC: 0.8 MG/DL (ref 0.5–0.9)
ECHO BSA: 1.75 M2
ECHO LV EF PHYSICIAN: 65 %
ECHO LV FRACTIONAL SHORTENING: 39 % (ref 28–44)
ECHO LV INTERNAL DIMENSION DIASTOLE INDEX: 2.64 CM/M2
ECHO LV INTERNAL DIMENSION DIASTOLIC: 4.6 CM (ref 3.9–5.3)
ECHO LV INTERNAL DIMENSION SYSTOLIC INDEX: 1.61 CM/M2
ECHO LV INTERNAL DIMENSION SYSTOLIC: 2.8 CM
ECHO LV IVSD: 1.2 CM (ref 0.6–0.9)
ECHO LV MASS 2D: 205 G (ref 67–162)
ECHO LV MASS INDEX 2D: 117.8 G/M2 (ref 43–95)
ECHO LV POSTERIOR WALL DIASTOLIC: 1.2 CM (ref 0.6–0.9)
ECHO LV RELATIVE WALL THICKNESS RATIO: 0.52
GFR, ESTIMATED: 74 ML/MIN/1.73M2
GLUCOSE SERPL-MCNC: 89 MG/DL (ref 70–99)
POTASSIUM SERPL-SCNC: 3.6 MMOL/L (ref 3.7–5.3)
SODIUM SERPL-SCNC: 143 MMOL/L (ref 135–144)
THEOPHYLLINE DATE LAST DOSE: ABNORMAL
THEOPHYLLINE DOSE AMOUNT: ABNORMAL
THEOPHYLLINE LEVEL: 17.7 UG/ML (ref 5–15)
THEOPHYLLINE TIME LAST DOSE: ABNORMAL

## 2025-02-27 PROCEDURE — 97530 THERAPEUTIC ACTIVITIES: CPT

## 2025-02-27 PROCEDURE — 99233 SBSQ HOSP IP/OBS HIGH 50: CPT | Performed by: NURSE PRACTITIONER

## 2025-02-27 PROCEDURE — 93308 TTE F-UP OR LMTD: CPT

## 2025-02-27 PROCEDURE — 6370000000 HC RX 637 (ALT 250 FOR IP)

## 2025-02-27 PROCEDURE — 97535 SELF CARE MNGMENT TRAINING: CPT

## 2025-02-27 PROCEDURE — 1200000000 HC SEMI PRIVATE

## 2025-02-27 PROCEDURE — 80048 BASIC METABOLIC PNL TOTAL CA: CPT

## 2025-02-27 PROCEDURE — 36415 COLL VENOUS BLD VENIPUNCTURE: CPT

## 2025-02-27 PROCEDURE — 93880 EXTRACRANIAL BILAT STUDY: CPT

## 2025-02-27 PROCEDURE — 93308 TTE F-UP OR LMTD: CPT | Performed by: INTERNAL MEDICINE

## 2025-02-27 PROCEDURE — 97116 GAIT TRAINING THERAPY: CPT

## 2025-02-27 PROCEDURE — 2580000003 HC RX 258: Performed by: INTERNAL MEDICINE

## 2025-02-27 PROCEDURE — 6370000000 HC RX 637 (ALT 250 FOR IP): Performed by: INTERNAL MEDICINE

## 2025-02-27 RX ORDER — AMLODIPINE BESYLATE 5 MG/1
5 TABLET ORAL DAILY
Status: DISCONTINUED | OUTPATIENT
Start: 2025-02-27 | End: 2025-02-28

## 2025-02-27 RX ORDER — 0.9 % SODIUM CHLORIDE 0.9 %
500 INTRAVENOUS SOLUTION INTRAVENOUS ONCE
Status: COMPLETED | OUTPATIENT
Start: 2025-02-27 | End: 2025-02-27

## 2025-02-27 RX ORDER — SPIRONOLACTONE 25 MG/1
12.5 TABLET ORAL DAILY
Status: DISCONTINUED | OUTPATIENT
Start: 2025-02-27 | End: 2025-03-01

## 2025-02-27 RX ADMIN — ATORVASTATIN CALCIUM 40 MG: 40 TABLET, FILM COATED ORAL at 20:24

## 2025-02-27 RX ADMIN — CARBIDOPA AND LEVODOPA 1 TABLET: 25; 100 TABLET ORAL at 09:00

## 2025-02-27 RX ADMIN — ASPIRIN 81 MG 81 MG: 81 TABLET ORAL at 08:59

## 2025-02-27 RX ADMIN — SODIUM CHLORIDE 500 ML: 0.9 INJECTION, SOLUTION INTRAVENOUS at 13:47

## 2025-02-27 RX ADMIN — SPIRONOLACTONE 12.5 MG: 25 TABLET, FILM COATED ORAL at 11:34

## 2025-02-27 RX ADMIN — DIVALPROEX SODIUM 125 MG: 125 TABLET, DELAYED RELEASE ORAL at 09:00

## 2025-02-27 RX ADMIN — AMLODIPINE BESYLATE 5 MG: 5 TABLET ORAL at 13:44

## 2025-02-27 RX ADMIN — MIRTAZAPINE 7.5 MG: 15 TABLET, FILM COATED ORAL at 20:24

## 2025-02-27 RX ADMIN — CARBIDOPA AND LEVODOPA 1 TABLET: 25; 100 TABLET ORAL at 20:24

## 2025-02-27 RX ADMIN — DONEPEZIL HYDROCHLORIDE 10 MG: 5 TABLET, FILM COATED ORAL at 20:24

## 2025-02-27 RX ADMIN — DIVALPROEX SODIUM 125 MG: 125 TABLET, DELAYED RELEASE ORAL at 20:24

## 2025-02-27 RX ADMIN — THEOPHYLLINE 150 MG: 300 TABLET, EXTENDED RELEASE ORAL at 08:58

## 2025-02-27 RX ADMIN — CARBIDOPA AND LEVODOPA 1 TABLET: 25; 100 TABLET ORAL at 13:44

## 2025-02-27 ASSESSMENT — PAIN DESCRIPTION - LOCATION: LOCATION: ABDOMEN

## 2025-02-27 ASSESSMENT — PAIN SCALES - GENERAL: PAINLEVEL_OUTOF10: 7

## 2025-02-27 NOTE — PLAN OF CARE
Problem: Safety - Adult  Goal: Free from fall injury  2/27/2025 0751 by Eliceo Jalloh LPN  Outcome: Progressing  2/27/2025 0339 by Zollinger, Sheri, RN  Outcome: Progressing  2/26/2025 1849 by Eliceo Jalloh LPN  Outcome: Progressing     Problem: Discharge Planning  Goal: Discharge to home or other facility with appropriate resources  2/27/2025 0751 by Eliceo Jalloh LPN  Outcome: Progressing  2/27/2025 0339 by Zollinger, Sheri, RN  Outcome: Progressing  2/26/2025 1849 by Eliceo Jalloh LPN  Outcome: Progressing     Problem: ABCDS Injury Assessment  Goal: Absence of physical injury  2/27/2025 0751 by Eliceo Jalloh LPN  Outcome: Progressing  2/27/2025 0339 by Zollinger, Sheri, RN  Outcome: Progressing  2/26/2025 1849 by Eliceo Jalloh LPN  Outcome: Progressing

## 2025-02-27 NOTE — PLAN OF CARE
Problem: Safety - Adult  Goal: Free from fall injury  2/27/2025 0339 by Zollinger, Sheri, RN  Outcome: Progressing     Problem: Discharge Planning  Goal: Discharge to home or other facility with appropriate resources  2/27/2025 0339 by Zollinger, Sheri, RN  Outcome: Progressing     Problem: ABCDS Injury Assessment  Goal: Absence of physical injury  2/27/2025 0339 by Zollinger, Sheri, RN  Outcome: Progressing

## 2025-02-28 LAB
ANION GAP SERPL CALCULATED.3IONS-SCNC: 9 MMOL/L (ref 9–17)
BUN SERPL-MCNC: 6 MG/DL (ref 8–23)
CALCIUM SERPL-MCNC: 9.7 MG/DL (ref 8.6–10.4)
CHLORIDE SERPL-SCNC: 105 MMOL/L (ref 98–107)
CO2 SERPL-SCNC: 26 MMOL/L (ref 20–31)
CREAT SERPL-MCNC: 0.8 MG/DL (ref 0.5–0.9)
ECHO BSA: 1.75 M2
GFR, ESTIMATED: 74 ML/MIN/1.73M2
GLUCOSE SERPL-MCNC: 85 MG/DL (ref 70–99)
POTASSIUM SERPL-SCNC: 3.8 MMOL/L (ref 3.7–5.3)
SODIUM SERPL-SCNC: 140 MMOL/L (ref 135–144)
VAS LEFT CCA DIST EDV: 10 CM/S
VAS LEFT CCA DIST PSV: 90.2 CM/S
VAS LEFT CCA PROX EDV: 22.5 CM/S
VAS LEFT CCA PROX PSV: 145.4 CM/S
VAS LEFT ECA EDV: 15.9 CM/S
VAS LEFT ECA PSV: 74.7 CM/S
VAS LEFT ICA DIST EDV: 0 CM/S
VAS LEFT ICA DIST PSV: 59.3 CM/S
VAS LEFT ICA PROX EDV: 0 CM/S
VAS LEFT ICA PROX PSV: 72.9 CM/S
VAS LEFT ICA/CCA PSV: 0.8 NO UNITS
VAS LEFT VERTEBRAL EDV: 9.7 CM/S
VAS LEFT VERTEBRAL PSV: 28 CM/S
VAS RIGHT CCA DIST EDV: 8.8 CM/S
VAS RIGHT CCA DIST PSV: 66.5 CM/S
VAS RIGHT CCA PROX EDV: 8.9 CM/S
VAS RIGHT CCA PROX PSV: 121.7 CM/S
VAS RIGHT ECA EDV: 5.2 CM/S
VAS RIGHT ECA PSV: 89.3 CM/S
VAS RIGHT ICA DIST EDV: 14.1 CM/S
VAS RIGHT ICA DIST PSV: 58.9 CM/S
VAS RIGHT ICA PROX EDV: 14.1 CM/S
VAS RIGHT ICA PROX PSV: 69.6 CM/S
VAS RIGHT ICA/CCA PSV: 1 NO UNITS
VAS RIGHT VERTEBRAL EDV: 7 CM/S
VAS RIGHT VERTEBRAL PSV: 51.6 CM/S

## 2025-02-28 PROCEDURE — 6360000002 HC RX W HCPCS

## 2025-02-28 PROCEDURE — 2500000003 HC RX 250 WO HCPCS

## 2025-02-28 PROCEDURE — 1200000000 HC SEMI PRIVATE

## 2025-02-28 PROCEDURE — 36415 COLL VENOUS BLD VENIPUNCTURE: CPT

## 2025-02-28 PROCEDURE — 97110 THERAPEUTIC EXERCISES: CPT

## 2025-02-28 PROCEDURE — 99233 SBSQ HOSP IP/OBS HIGH 50: CPT | Performed by: NURSE PRACTITIONER

## 2025-02-28 PROCEDURE — 6370000000 HC RX 637 (ALT 250 FOR IP): Performed by: NURSE PRACTITIONER

## 2025-02-28 PROCEDURE — 6370000000 HC RX 637 (ALT 250 FOR IP): Performed by: INTERNAL MEDICINE

## 2025-02-28 PROCEDURE — 97116 GAIT TRAINING THERAPY: CPT

## 2025-02-28 PROCEDURE — 97535 SELF CARE MNGMENT TRAINING: CPT

## 2025-02-28 PROCEDURE — 6370000000 HC RX 637 (ALT 250 FOR IP)

## 2025-02-28 PROCEDURE — 80048 BASIC METABOLIC PNL TOTAL CA: CPT

## 2025-02-28 PROCEDURE — 2580000003 HC RX 258

## 2025-02-28 PROCEDURE — 93880 EXTRACRANIAL BILAT STUDY: CPT | Performed by: STUDENT IN AN ORGANIZED HEALTH CARE EDUCATION/TRAINING PROGRAM

## 2025-02-28 RX ORDER — FUROSEMIDE 20 MG/1
20 TABLET ORAL DAILY PRN
Status: DISCONTINUED | OUTPATIENT
Start: 2025-03-01 | End: 2025-03-01

## 2025-02-28 RX ORDER — SACUBITRIL AND VALSARTAN 24; 26 MG/1; MG/1
0.5 TABLET, FILM COATED ORAL 2 TIMES DAILY
Status: DISCONTINUED | OUTPATIENT
Start: 2025-02-28 | End: 2025-03-01

## 2025-02-28 RX ADMIN — SODIUM CHLORIDE: 0.9 INJECTION, SOLUTION INTRAVENOUS at 02:33

## 2025-02-28 RX ADMIN — DIVALPROEX SODIUM 125 MG: 125 TABLET, DELAYED RELEASE ORAL at 08:47

## 2025-02-28 RX ADMIN — MIRTAZAPINE 7.5 MG: 15 TABLET, FILM COATED ORAL at 22:34

## 2025-02-28 RX ADMIN — CARBIDOPA AND LEVODOPA 1 TABLET: 25; 100 TABLET ORAL at 08:46

## 2025-02-28 RX ADMIN — SODIUM CHLORIDE, PRESERVATIVE FREE 10 ML: 5 INJECTION INTRAVENOUS at 08:47

## 2025-02-28 RX ADMIN — ATORVASTATIN CALCIUM 40 MG: 40 TABLET, FILM COATED ORAL at 22:35

## 2025-02-28 RX ADMIN — ENOXAPARIN SODIUM 40 MG: 100 INJECTION SUBCUTANEOUS at 08:47

## 2025-02-28 RX ADMIN — CARBIDOPA AND LEVODOPA 1 TABLET: 25; 100 TABLET ORAL at 22:35

## 2025-02-28 RX ADMIN — AMLODIPINE BESYLATE 5 MG: 5 TABLET ORAL at 08:47

## 2025-02-28 RX ADMIN — SPIRONOLACTONE 12.5 MG: 25 TABLET, FILM COATED ORAL at 08:57

## 2025-02-28 RX ADMIN — SODIUM CHLORIDE, PRESERVATIVE FREE 10 ML: 5 INJECTION INTRAVENOUS at 22:42

## 2025-02-28 RX ADMIN — CARBIDOPA AND LEVODOPA 1 TABLET: 25; 100 TABLET ORAL at 14:46

## 2025-02-28 RX ADMIN — DONEPEZIL HYDROCHLORIDE 10 MG: 5 TABLET, FILM COATED ORAL at 22:34

## 2025-02-28 RX ADMIN — ASPIRIN 81 MG 81 MG: 81 TABLET ORAL at 08:47

## 2025-02-28 RX ADMIN — SACUBITRIL AND VALSARTAN 0.5 TABLET: 24; 26 TABLET, FILM COATED ORAL at 22:35

## 2025-02-28 RX ADMIN — DIVALPROEX SODIUM 125 MG: 125 TABLET, DELAYED RELEASE ORAL at 22:35

## 2025-02-28 ASSESSMENT — PAIN SCALES - GENERAL: PAINLEVEL_OUTOF10: 0

## 2025-02-28 NOTE — PLAN OF CARE
Problem: Safety - Adult  Goal: Free from fall injury  2/28/2025 1126 by Juani Sanchez, RN  Outcome: Progressing   Safety maintained.   Problem: Discharge Planning  Goal: Discharge to home or other facility with appropriate resources  2/28/2025 1126 by Juani Sanchez, RN  Outcome: Progressing   Pt to discharge back to facility once medically cleared.   Problem: ABCDS Injury Assessment  Goal: Absence of physical injury  2/28/2025 1126 by Juani Sanchez, RN  Outcome: Progressing   No injury noted this shift.   Problem: Skin/Tissue Integrity  Goal: Skin integrity remains intact  Description: 1.  Monitor for areas of redness and/or skin breakdown  2.  Assess vascular access sites hourly  3.  Every 4-6 hours minimum:  Change oxygen saturation probe site  4.  Every 4-6 hours:  If on nasal continuous positive airway pressure, respiratory therapy assess nares and determine need for appliance change or resting period  2/28/2025 1126 by Juani Sanchez, RN  Outcome: Progressing   No new altered skin noted this shift.   Problem: Pain  Goal: Verbalizes/displays adequate comfort level or baseline comfort level  Outcome: Progressing   Monitoring.

## 2025-02-28 NOTE — PLAN OF CARE
Problem: Safety - Adult  Goal: Free from fall injury  Outcome: Progressing     Problem: Discharge Planning  Goal: Discharge to home or other facility with appropriate resources  Outcome: Progressing     Problem: ABCDS Injury Assessment  Goal: Absence of physical injury  Outcome: Progressing     Problem: Skin/Tissue Integrity  Goal: Skin integrity remains intact  Description: 1.  Monitor for areas of redness and/or skin breakdown  2.  Assess vascular access sites hourly  3.  Every 4-6 hours minimum:  Change oxygen saturation probe site  4.  Every 4-6 hours:  If on nasal continuous positive airway pressure, respiratory therapy assess nares and determine need for appliance change or resting period  Outcome: Progressing

## 2025-02-28 NOTE — FLOWSHEET NOTE
02/28/25 1432   Vital Signs   Orthostatic B/P and Pulse? Yes   Blood Pressure Lying 151/61   Pulse Lying 59 PER MINUTE   Blood Pressure Sitting 153/66   Pulse Sitting 87 PER MINUTE   Blood Pressure Standing 149/90   Pulse Standing 92 PER MINUTE

## 2025-03-01 PROCEDURE — 99232 SBSQ HOSP IP/OBS MODERATE 35: CPT | Performed by: INTERNAL MEDICINE

## 2025-03-01 PROCEDURE — 1200000000 HC SEMI PRIVATE

## 2025-03-01 PROCEDURE — 6360000002 HC RX W HCPCS

## 2025-03-01 PROCEDURE — 2500000003 HC RX 250 WO HCPCS

## 2025-03-01 PROCEDURE — 6370000000 HC RX 637 (ALT 250 FOR IP)

## 2025-03-01 PROCEDURE — 6370000000 HC RX 637 (ALT 250 FOR IP): Performed by: INTERNAL MEDICINE

## 2025-03-01 RX ORDER — SACUBITRIL AND VALSARTAN 24; 26 MG/1; MG/1
1 TABLET, FILM COATED ORAL 2 TIMES DAILY
Status: DISCONTINUED | OUTPATIENT
Start: 2025-03-01 | End: 2025-03-03 | Stop reason: HOSPADM

## 2025-03-01 RX ORDER — SPIRONOLACTONE 25 MG/1
12.5 TABLET ORAL DAILY
Status: DISCONTINUED | OUTPATIENT
Start: 2025-03-01 | End: 2025-03-03 | Stop reason: HOSPADM

## 2025-03-01 RX ORDER — DEXAMETHASONE SODIUM PHOSPHATE 1 MG/ML
2 SOLUTION/ DROPS OPHTHALMIC 2 TIMES DAILY
Status: DISCONTINUED | OUTPATIENT
Start: 2025-03-01 | End: 2025-03-01

## 2025-03-01 RX ORDER — HYDRALAZINE HYDROCHLORIDE 20 MG/ML
10 INJECTION INTRAMUSCULAR; INTRAVENOUS EVERY 6 HOURS PRN
Status: DISCONTINUED | OUTPATIENT
Start: 2025-03-01 | End: 2025-03-03 | Stop reason: HOSPADM

## 2025-03-01 RX ORDER — PREDNISOLONE ACETATE 10 MG/ML
2 SUSPENSION/ DROPS OPHTHALMIC 2 TIMES DAILY
Status: DISCONTINUED | OUTPATIENT
Start: 2025-03-01 | End: 2025-03-03 | Stop reason: HOSPADM

## 2025-03-01 RX ORDER — CIPROFLOXACIN HYDROCHLORIDE 3.5 MG/ML
2 SOLUTION/ DROPS TOPICAL 2 TIMES DAILY
Status: DISCONTINUED | OUTPATIENT
Start: 2025-03-01 | End: 2025-03-03 | Stop reason: HOSPADM

## 2025-03-01 RX ORDER — DIVALPROEX SODIUM 125 MG/1
125 CAPSULE, COATED PELLETS ORAL EVERY 12 HOURS SCHEDULED
Status: DISCONTINUED | OUTPATIENT
Start: 2025-03-01 | End: 2025-03-03 | Stop reason: HOSPADM

## 2025-03-01 RX ORDER — AMLODIPINE BESYLATE 5 MG/1
5 TABLET ORAL DAILY
Status: DISCONTINUED | OUTPATIENT
Start: 2025-03-01 | End: 2025-03-01

## 2025-03-01 RX ORDER — CIPROFLOXACIN HYDROCHLORIDE 3.5 MG/ML
2 SOLUTION/ DROPS TOPICAL 2 TIMES DAILY
Status: DISCONTINUED | OUTPATIENT
Start: 2025-03-01 | End: 2025-03-01

## 2025-03-01 RX ADMIN — SODIUM CHLORIDE, PRESERVATIVE FREE 10 ML: 5 INJECTION INTRAVENOUS at 21:16

## 2025-03-01 RX ADMIN — CARBIDOPA AND LEVODOPA 1 TABLET: 25; 100 TABLET ORAL at 10:02

## 2025-03-01 RX ADMIN — SACUBITRIL AND VALSARTAN 1 TABLET: 24; 26 TABLET, FILM COATED ORAL at 21:16

## 2025-03-01 RX ADMIN — DIVALPROEX SODIUM 125 MG: 125 CAPSULE, COATED PELLETS ORAL at 10:02

## 2025-03-01 RX ADMIN — DONEPEZIL HYDROCHLORIDE 10 MG: 5 TABLET, FILM COATED ORAL at 21:15

## 2025-03-01 RX ADMIN — ENOXAPARIN SODIUM 40 MG: 100 INJECTION SUBCUTANEOUS at 10:09

## 2025-03-01 RX ADMIN — ATORVASTATIN CALCIUM 40 MG: 40 TABLET, FILM COATED ORAL at 21:16

## 2025-03-01 RX ADMIN — ACETAMINOPHEN 650 MG: 325 TABLET ORAL at 03:06

## 2025-03-01 RX ADMIN — CARBIDOPA AND LEVODOPA 1 TABLET: 25; 100 TABLET ORAL at 14:59

## 2025-03-01 RX ADMIN — DIVALPROEX SODIUM 125 MG: 125 CAPSULE, COATED PELLETS ORAL at 21:15

## 2025-03-01 RX ADMIN — MIRTAZAPINE 7.5 MG: 15 TABLET, FILM COATED ORAL at 21:15

## 2025-03-01 RX ADMIN — ASPIRIN 81 MG 81 MG: 81 TABLET ORAL at 10:02

## 2025-03-01 RX ADMIN — CIPROFLOXACIN HYDROCHLORIDE 2 DROP: 3 SOLUTION/ DROPS OPHTHALMIC at 16:44

## 2025-03-01 RX ADMIN — SODIUM CHLORIDE, PRESERVATIVE FREE 10 ML: 5 INJECTION INTRAVENOUS at 10:02

## 2025-03-01 RX ADMIN — PREDNISOLONE ACETATE 2 DROP: 10 SUSPENSION/ DROPS OPHTHALMIC at 17:33

## 2025-03-01 RX ADMIN — SACUBITRIL AND VALSARTAN 1 TABLET: 24; 26 TABLET, FILM COATED ORAL at 10:02

## 2025-03-01 RX ADMIN — CARBIDOPA AND LEVODOPA 1 TABLET: 25; 100 TABLET ORAL at 21:15

## 2025-03-01 RX ADMIN — SPIRONOLACTONE 12.5 MG: 25 TABLET, FILM COATED ORAL at 12:31

## 2025-03-01 ASSESSMENT — PAIN SCALES - GENERAL
PAINLEVEL_OUTOF10: 6
PAINLEVEL_OUTOF10: 0
PAINLEVEL_OUTOF10: 8
PAINLEVEL_OUTOF10: 0
PAINLEVEL_OUTOF10: 0

## 2025-03-01 ASSESSMENT — PAIN DESCRIPTION - LOCATION: LOCATION: OTHER (COMMENT)

## 2025-03-01 ASSESSMENT — PAIN DESCRIPTION - DESCRIPTORS: DESCRIPTORS: ACHING

## 2025-03-01 NOTE — PLAN OF CARE
Problem: Safety - Adult  Goal: Free from fall injury  Outcome: Progressing  Flowsheets (Taken 3/1/2025 1543)  Free From Fall Injury:   Based on caregiver fall risk screen, instruct family/caregiver to ask for assistance with transferring infant if caregiver noted to have fall risk factors   Instruct family/caregiver on patient safety     Problem: Discharge Planning  Goal: Discharge to home or other facility with appropriate resources  Outcome: Progressing  Flowsheets (Taken 2/25/2025 5357 by Zollinger, Sheri, RN)  Discharge to home or other facility with appropriate resources:   Identify barriers to discharge with patient and caregiver   Arrange for interpreters to assist at discharge as needed     Problem: ABCDS Injury Assessment  Goal: Absence of physical injury  Outcome: Progressing  Flowsheets (Taken 3/1/2025 1543)  Absence of Physical Injury: Implement safety measures based on patient assessment     Problem: Skin/Tissue Integrity  Goal: Skin integrity remains intact  Description: 1.  Monitor for areas of redness and/or skin breakdown  2.  Assess vascular access sites hourly  3.  Every 4-6 hours minimum:  Change oxygen saturation probe site  4.  Every 4-6 hours:  If on nasal continuous positive airway pressure, respiratory therapy assess nares and determine need for appliance change or resting period  Outcome: Progressing  Flowsheets (Taken 2/28/2025 1127 by Juani Sanchez, RN)  Skin Integrity Remains Intact: Monitor for areas of redness and/or skin breakdown     Problem: Pain  Goal: Verbalizes/displays adequate comfort level or baseline comfort level  Outcome: Progressing  Flowsheets (Taken 3/1/2025 3993)  Verbalizes/displays adequate comfort level or baseline comfort level: Encourage patient to monitor pain and request assistance

## 2025-03-02 LAB
ANION GAP SERPL CALCULATED.3IONS-SCNC: 11 MMOL/L (ref 9–17)
BUN SERPL-MCNC: 9 MG/DL (ref 8–23)
CALCIUM SERPL-MCNC: 9.9 MG/DL (ref 8.6–10.4)
CHLORIDE SERPL-SCNC: 104 MMOL/L (ref 98–107)
CO2 SERPL-SCNC: 26 MMOL/L (ref 20–31)
CREAT SERPL-MCNC: 0.8 MG/DL (ref 0.5–0.9)
GFR, ESTIMATED: 74 ML/MIN/1.73M2
GLUCOSE SERPL-MCNC: 90 MG/DL (ref 70–99)
POTASSIUM SERPL-SCNC: 3.7 MMOL/L (ref 3.7–5.3)
SODIUM SERPL-SCNC: 141 MMOL/L (ref 135–144)

## 2025-03-02 PROCEDURE — 6360000002 HC RX W HCPCS: Performed by: INTERNAL MEDICINE

## 2025-03-02 PROCEDURE — 2500000003 HC RX 250 WO HCPCS

## 2025-03-02 PROCEDURE — 99232 SBSQ HOSP IP/OBS MODERATE 35: CPT | Performed by: INTERNAL MEDICINE

## 2025-03-02 PROCEDURE — 6370000000 HC RX 637 (ALT 250 FOR IP)

## 2025-03-02 PROCEDURE — 1200000000 HC SEMI PRIVATE

## 2025-03-02 PROCEDURE — 6360000002 HC RX W HCPCS

## 2025-03-02 PROCEDURE — 6370000000 HC RX 637 (ALT 250 FOR IP): Performed by: INTERNAL MEDICINE

## 2025-03-02 PROCEDURE — 36415 COLL VENOUS BLD VENIPUNCTURE: CPT

## 2025-03-02 PROCEDURE — 80048 BASIC METABOLIC PNL TOTAL CA: CPT

## 2025-03-02 RX ORDER — HYDRALAZINE HYDROCHLORIDE 25 MG/1
25 TABLET, FILM COATED ORAL EVERY 8 HOURS SCHEDULED
Status: DISCONTINUED | OUTPATIENT
Start: 2025-03-02 | End: 2025-03-03 | Stop reason: HOSPADM

## 2025-03-02 RX ADMIN — POLYETHYLENE GLYCOL 3350 17 G: 17 POWDER, FOR SOLUTION ORAL at 14:41

## 2025-03-02 RX ADMIN — CARBIDOPA AND LEVODOPA 1 TABLET: 25; 100 TABLET ORAL at 14:35

## 2025-03-02 RX ADMIN — ACETAMINOPHEN 650 MG: 325 TABLET ORAL at 21:39

## 2025-03-02 RX ADMIN — SACUBITRIL AND VALSARTAN 1 TABLET: 24; 26 TABLET, FILM COATED ORAL at 21:16

## 2025-03-02 RX ADMIN — CIPROFLOXACIN HYDROCHLORIDE 2 DROP: 3 SOLUTION/ DROPS OPHTHALMIC at 10:24

## 2025-03-02 RX ADMIN — ATORVASTATIN CALCIUM 40 MG: 40 TABLET, FILM COATED ORAL at 20:26

## 2025-03-02 RX ADMIN — HYDRALAZINE HYDROCHLORIDE 25 MG: 25 TABLET ORAL at 21:15

## 2025-03-02 RX ADMIN — HYDRALAZINE HYDROCHLORIDE 10 MG: 20 INJECTION INTRAMUSCULAR; INTRAVENOUS at 08:47

## 2025-03-02 RX ADMIN — DONEPEZIL HYDROCHLORIDE 10 MG: 5 TABLET, FILM COATED ORAL at 20:26

## 2025-03-02 RX ADMIN — SODIUM CHLORIDE, PRESERVATIVE FREE 10 ML: 5 INJECTION INTRAVENOUS at 20:26

## 2025-03-02 RX ADMIN — SODIUM CHLORIDE, PRESERVATIVE FREE 10 ML: 5 INJECTION INTRAVENOUS at 10:15

## 2025-03-02 RX ADMIN — CARBIDOPA AND LEVODOPA 1 TABLET: 25; 100 TABLET ORAL at 20:26

## 2025-03-02 RX ADMIN — ASPIRIN 81 MG 81 MG: 81 TABLET ORAL at 10:15

## 2025-03-02 RX ADMIN — HYDRALAZINE HYDROCHLORIDE 25 MG: 25 TABLET ORAL at 14:35

## 2025-03-02 RX ADMIN — DIVALPROEX SODIUM 125 MG: 125 CAPSULE, COATED PELLETS ORAL at 20:25

## 2025-03-02 RX ADMIN — PREDNISOLONE ACETATE 2 DROP: 10 SUSPENSION/ DROPS OPHTHALMIC at 21:15

## 2025-03-02 RX ADMIN — DIVALPROEX SODIUM 125 MG: 125 CAPSULE, COATED PELLETS ORAL at 10:15

## 2025-03-02 RX ADMIN — CIPROFLOXACIN HYDROCHLORIDE 2 DROP: 3 SOLUTION/ DROPS OPHTHALMIC at 21:16

## 2025-03-02 RX ADMIN — CARBIDOPA AND LEVODOPA 1 TABLET: 25; 100 TABLET ORAL at 10:15

## 2025-03-02 RX ADMIN — PREDNISOLONE ACETATE 2 DROP: 10 SUSPENSION/ DROPS OPHTHALMIC at 11:00

## 2025-03-02 RX ADMIN — SPIRONOLACTONE 12.5 MG: 25 TABLET, FILM COATED ORAL at 10:15

## 2025-03-02 RX ADMIN — ENOXAPARIN SODIUM 40 MG: 100 INJECTION SUBCUTANEOUS at 10:15

## 2025-03-02 RX ADMIN — SACUBITRIL AND VALSARTAN 1 TABLET: 24; 26 TABLET, FILM COATED ORAL at 10:15

## 2025-03-02 RX ADMIN — MIRTAZAPINE 7.5 MG: 15 TABLET, FILM COATED ORAL at 20:24

## 2025-03-02 ASSESSMENT — PAIN SCALES - GENERAL
PAINLEVEL_OUTOF10: 2
PAINLEVEL_OUTOF10: 6

## 2025-03-02 ASSESSMENT — PAIN DESCRIPTION - DESCRIPTORS: DESCRIPTORS: ACHING

## 2025-03-02 NOTE — PLAN OF CARE
Problem: Safety - Adult  Goal: Free from fall injury  Outcome: Progressing  Flowsheets (Taken 3/1/2025 1543)  Free From Fall Injury:   Based on caregiver fall risk screen, instruct family/caregiver to ask for assistance with transferring infant if caregiver noted to have fall risk factors   Instruct family/caregiver on patient safety     Problem: Discharge Planning  Goal: Discharge to home or other facility with appropriate resources  Outcome: Progressing  Flowsheets (Taken 3/2/2025 1015)  Discharge to home or other facility with appropriate resources: Identify barriers to discharge with patient and caregiver     Problem: ABCDS Injury Assessment  Goal: Absence of physical injury  Outcome: Progressing  Flowsheets (Taken 3/1/2025 1543)  Absence of Physical Injury: Implement safety measures based on patient assessment     Problem: Skin/Tissue Integrity  Goal: Skin integrity remains intact  Description: 1.  Monitor for areas of redness and/or skin breakdown  2.  Assess vascular access sites hourly  3.  Every 4-6 hours minimum:  Change oxygen saturation probe site  4.  Every 4-6 hours:  If on nasal continuous positive airway pressure, respiratory therapy assess nares and determine need for appliance change or resting period  Outcome: Progressing  Flowsheets (Taken 3/1/2025 1600)  Skin Integrity Remains Intact: Monitor for areas of redness and/or skin breakdown     Problem: Pain  Goal: Verbalizes/displays adequate comfort level or baseline comfort level  Outcome: Progressing  Flowsheets (Taken 3/1/2025 0828)  Verbalizes/displays adequate comfort level or baseline comfort level: Encourage patient to monitor pain and request assistance

## 2025-03-02 NOTE — PLAN OF CARE
Problem: Safety - Adult  Goal: Free from fall injury  3/1/2025 2328 by Marisela Martinez LPN  Outcome: Progressing     Problem: Discharge Planning  Goal: Discharge to home or other facility with appropriate resources  3/1/2025 2328 by Marisela Martinez LPN  Outcome: Progressing     Problem: Pain  Goal: Verbalizes/displays adequate comfort level or baseline comfort level  3/1/2025 2328 by Marisela Martinez LPN  Outcome: Progressing

## 2025-03-03 VITALS
HEART RATE: 51 BPM | RESPIRATION RATE: 16 BRPM | HEIGHT: 66 IN | TEMPERATURE: 97.3 F | DIASTOLIC BLOOD PRESSURE: 49 MMHG | WEIGHT: 145 LBS | OXYGEN SATURATION: 98 % | SYSTOLIC BLOOD PRESSURE: 126 MMHG | BODY MASS INDEX: 23.3 KG/M2

## 2025-03-03 PROCEDURE — 2500000003 HC RX 250 WO HCPCS

## 2025-03-03 PROCEDURE — 6370000000 HC RX 637 (ALT 250 FOR IP)

## 2025-03-03 PROCEDURE — 6370000000 HC RX 637 (ALT 250 FOR IP): Performed by: INTERNAL MEDICINE

## 2025-03-03 PROCEDURE — 99239 HOSP IP/OBS DSCHRG MGMT >30: CPT | Performed by: INTERNAL MEDICINE

## 2025-03-03 PROCEDURE — 6360000002 HC RX W HCPCS

## 2025-03-03 RX ORDER — PREDNISOLONE ACETATE 10 MG/ML
2 SUSPENSION/ DROPS OPHTHALMIC 2 TIMES DAILY
DISCHARGE
Start: 2025-03-03 | End: 2025-03-08

## 2025-03-03 RX ORDER — DIVALPROEX SODIUM 125 MG/1
125 CAPSULE, COATED PELLETS ORAL 2 TIMES DAILY
DISCHARGE
Start: 2025-03-03

## 2025-03-03 RX ORDER — SACUBITRIL AND VALSARTAN 24; 26 MG/1; MG/1
1 TABLET, FILM COATED ORAL 2 TIMES DAILY
DISCHARGE
Start: 2025-03-03

## 2025-03-03 RX ORDER — CIPROFLOXACIN HYDROCHLORIDE 3.5 MG/ML
2 SOLUTION/ DROPS TOPICAL 2 TIMES DAILY
DISCHARGE
Start: 2025-03-03 | End: 2025-03-08

## 2025-03-03 RX ORDER — HYDRALAZINE HYDROCHLORIDE 25 MG/1
25 TABLET, FILM COATED ORAL EVERY 8 HOURS SCHEDULED
DISCHARGE
Start: 2025-03-03

## 2025-03-03 RX ADMIN — SACUBITRIL AND VALSARTAN 1 TABLET: 24; 26 TABLET, FILM COATED ORAL at 09:09

## 2025-03-03 RX ADMIN — ASPIRIN 81 MG 81 MG: 81 TABLET ORAL at 09:10

## 2025-03-03 RX ADMIN — CARBIDOPA AND LEVODOPA 1 TABLET: 25; 100 TABLET ORAL at 15:03

## 2025-03-03 RX ADMIN — HYDRALAZINE HYDROCHLORIDE 25 MG: 25 TABLET ORAL at 15:03

## 2025-03-03 RX ADMIN — DIVALPROEX SODIUM 125 MG: 125 CAPSULE, COATED PELLETS ORAL at 09:10

## 2025-03-03 RX ADMIN — ENOXAPARIN SODIUM 40 MG: 100 INJECTION SUBCUTANEOUS at 09:16

## 2025-03-03 RX ADMIN — SODIUM CHLORIDE, PRESERVATIVE FREE 10 ML: 5 INJECTION INTRAVENOUS at 09:18

## 2025-03-03 RX ADMIN — PREDNISOLONE ACETATE 2 DROP: 10 SUSPENSION/ DROPS OPHTHALMIC at 09:18

## 2025-03-03 RX ADMIN — CARBIDOPA AND LEVODOPA 1 TABLET: 25; 100 TABLET ORAL at 09:10

## 2025-03-03 RX ADMIN — HYDRALAZINE HYDROCHLORIDE 25 MG: 25 TABLET ORAL at 05:37

## 2025-03-03 RX ADMIN — SPIRONOLACTONE 12.5 MG: 25 TABLET, FILM COATED ORAL at 09:10

## 2025-03-03 RX ADMIN — CIPROFLOXACIN HYDROCHLORIDE 2 DROP: 3 SOLUTION/ DROPS OPHTHALMIC at 08:40

## 2025-03-03 NOTE — DISCHARGE INSTR - COC
Continuity of Care Form    Patient Name: Jillian Britton   :  1945  MRN:  [id number]    Admit date:  2025  Discharge date:  2025    Code Status Order: Full Code   Advance Directives:   Advance Care Flowsheet Documentation             Admitting Physician:  No admitting provider for patient encounter.  PCP: Alexi Montes MD    Discharging Nurse: CHICO Levine  Discharging Hospital Unit/Room#: 318/318-01  Discharging Unit Phone Number: [phone]    Emergency Contact:   Extended Emergency Contact Information  Primary Emergency Contact: Nannette Souza  Mobile Phone: [phone]  Relation: Next of Kin   needed? No    Past Surgical History:  No past surgical history on file.    Immunization History:   Immunization History   Administered Date(s) Administered    COVID-19, PFIZER Bivalent, DO NOT Dilute, (age 12y+), IM, 30 mcg/0.3 mL 2023       Active Problems:  Patient Active Problem List   Diagnosis Code    Hyperlipidemia E78.5    Hypertension I10    Acute gout involving toe of left foot M10.9    Bradycardia R00.1    Dementia (HCC) F03.90    Syncope, unspecified syncope type R55    Chronic diastolic heart failure (HCC) I50.32       Isolation/Infection:   Isolation            No Isolation          Patient Infection Status       None to display            Nurse Assessment:  Last Vital Signs: BP (!) 142/57   Pulse 51   Temp 97.3 °F (36.3 °C) (Axillary)   Resp 16   Ht 1.676 m (5' 6\")   Wt 65.8 kg (145 lb)   SpO2 98%   BMI 23.40 kg/m²     Last documented pain score (0-10 scale): Pain Level: 2  Last Weight:   Wt Readings from Last 1 Encounters:   25 65.8 kg (145 lb)     Mental Status:  disoriented and alert to self    IV Access:  - None    Nursing Mobility/ADLs:  Walking   Dependent  Transfer  Assisted  Bathing  Dependent  Dressing  Assisted  Toileting  Dependent  Feeding  Assisted  Med Admin  Assisted  Med Delivery   crushed and prefers mixed with pudding    Wound Care Documentation

## 2025-03-03 NOTE — PLAN OF CARE
Problem: Safety - Adult  Goal: Free from fall injury  3/3/2025 0239 by Marisela Martinez LPN  Outcome: Progressing     Problem: Discharge Planning  Goal: Discharge to home or other facility with appropriate resources  3/3/2025 0239 by Marisela Martinez LPN  Outcome: Progressing     Problem: Pain  Goal: Verbalizes/displays adequate comfort level or baseline comfort level  3/3/2025 0239 by Marisela Martinez LPN  Outcome: Progressing      No

## 2025-03-03 NOTE — PROGRESS NOTES
Physician Progress Note      PATIENT:               GIGI BARRIOS  [facility] #:                  [id number]  :                       1945  ADMIT DATE:       2025 7:00 PM  DISCH DATE:  RESPONDING  PROVIDER #:        Rosamaria Verdin MD          QUERY TEXT:    Patient admitted with syncope. noted to have ortho static hypotension and   dehydration.  If possible, please document in progress notes and discharge   summary after study the etiology of the syncope:    The medical record reflects the following:    Risk Factors: HTN, dementia, CHF, dehydration    Clinical Indicators: c/o falling with syncope and unable to recall events.   BNP- 3656, CREAT- 1.3, 1.1, 0.8.  BUN - 13, 12, 6. GFR- 42, 62, 71.  ECHO   -2025- EF 65% with normal systolic function.  - ortho - VS-   @ 12:00  lying - BP- 178/79, HR -53  sitting - BP- 186/81, HR - 55  standing - BP- 155/82, HR - 78      Treatment: IVF- NS @ 50, IVF - 1 L bolus- NS - ;  ml bolus- .    PO meds- Xanax, Norvasc, ASAS81, Lipitor, Sinemet, Depakote, Aricept, Remeron,   klor-con, Entresto, Aldactone.  Monitor vital signs and hydration status.    Thank You  Options provided:  -- Syncope due to orthostatic hypotension  -- Syncope due to dehydration  -- Syncope due to orthostatic hypotension secondary to dehydration  -- Syncope due to, please specify etiology for the syncope  -- Other - I will add my own diagnosis  -- Disagree - Not applicable / Not valid  -- Disagree - Clinically unable to determine / Unknown  -- Refer to Clinical Documentation Reviewer    PROVIDER RESPONSE TEXT:    The syncope is due to orthostatic hypotension    Query created by: Calixto Jiménez on 2025 1:18 PM      QUERY TEXT:    Patient admitted with syncope, noted to have CREATININE- 1.3, 1.1, 0.8. If   possible, please document in progress notes and discharge summary if you are   evaluating and/or treating any of the following:    The medical record reflects the 
[facility]   Progress Note                   Date:   2/28/2025  Patient name: Jillian Britton  Date of admission:  2/25/2025  7:00 PM  MRN:   [id number]  YOB: 1945  PCP: Alexi Montes MD    Reason for Admission: Syncope, unspecified syncope type [R55]    Subjective:       Clinical Changes / Abnormalities:Pt seen and examined in the room.  Patient resting in bed. Pt denies any CP or sob.  Labs, vitals and tele reviewed-SR 67    Patient does not engage much during conversation. Shakes her head yes or no to questions but does not speak     Orthostatic BPs completed yesterday and this morning negative from lying to sitting but positive from sitting to standing. Patient denies dizziness   Medications:   Scheduled Meds:   spironolactone  12.5 mg Oral Daily    amLODIPine  5 mg Oral Daily    carbidopa-levodopa  1 tablet Oral TID    aspirin  81 mg Oral Daily    atorvastatin  40 mg Oral Daily    divalproex  125 mg Oral 2 times per day    donepezil  10 mg Oral Nightly    mirtazapine  7.5 mg Oral Nightly    sodium chloride flush  5-40 mL IntraVENous 2 times per day    enoxaparin  40 mg SubCUTAneous Daily     Continuous Infusions:   sodium chloride       CBC:   Recent Labs     02/25/25 1955   WBC 6.4   HGB 9.6*        BMP:    Recent Labs     02/25/25 1955 02/26/25 0205 02/27/25  0658    140 143   K 4.0 3.8 3.6*    107 108*   CO2 25 24 25   BUN 13 12 6*   CREATININE 1.3* 1.1* 0.8   GLUCOSE 130* 101* 89     Hepatic:   Recent Labs     02/25/25 1955 02/26/25 0205   AST 11 9   ALT <5* <5*   BILITOT 0.2* 0.2*   ALKPHOS 68 52     Troponin:   Recent Labs     02/25/25 2114 02/26/25  0110 02/26/25 0205   TROPHS 46* 47* 48*     BNP: No results for input(s): \"BNP\" in the last 72 hours.  Lipids: No results for input(s): \"CHOL\", \"HDL\" in the last 72 hours.    Invalid input(s): \"LDLCALCU\"  INR: No results for input(s): \"INR\" in the last 72 hours.    EKG  Normal sinus rhythm  ST & T 
[facility]  Office: [phone]  Zaid Sauceda DO, Bernardo Faulkner DO, Nick De La Paz DO, Fercho Brock DO, Linda Salvador MD, Rosamaria Verdin MD, Teena Puckett MD, Nga Russell MD,  Pradip Ely MD, Yvrose Elena MD, Devi Brink MD,  Olivia Hale DO, Robbin Nuñez MD, Brayden Gill MD, Calixto Sauceda DO, Jocelyne Sandoval MD,  Antione Mckenna DO, Grisel Tiwari MD, Joaquina Hughes MD, Ambreen Ernandez MD, Morgan Hernandez MD,  Shaka Stone MD, Shannan Arshad MD, Kim Cheung MD, Junito Hendrix MD, Mihir Francis MD, Hetal Villarreal MD, Jeovanny James DO, Selvin Keating MD, Olivia Perry MD, Mohsin Reza, MD, Shirley Waterhouse, CNP,  Bree Mueller CNP, Jeovanny eLpe, CNP,  Madina Hidalgo, DNP, Shira Lipscomb, CNP, Cristina Graves, CNP, Mary Bahena, CNP, Laura Puente CNP, Bernie Rodriguez, PA-C, Lexy Yuen, CNP, Daniel Spaulding, CNP,  Con[place]is [place], CNP, Chrissy Aparicio, CNP, Zo Bolaños, CNP,  Josy Glass, CNS, Jocelin Hemphill CNP, Irlanda Corcoran CNP,   Lizbeth Romero, CNP         [facility]   IN-PATIENT SERVICE   [facility]    Progress Note    2/27/2025    7:31 AM    Name:   Jillian Britton  MRN:     [id number]     Acct:      [id number]   Room:   [facility] Day:  2  Admit Date:  2/25/2025  7:00 PM    PCP:   Alexi Montes MD  Code Status:  Full Code    Subjective:     C/C:   Chief Complaint   Patient presents with    Altered Mental Status     Facility reports brief loc and low blood presssure     Interval History Status: improved.     Patient is up in bed.  She has a very flat affect, and answers questions mostly \"yes\" and 'no\".  She is eating lunch.  -160s systolic.  She has redness to her left eye, itchiness and puffiness.    Brief History:     Per the NP:  \"Jillian Britton is a 79 y.o. Declined female who presents with Altered Mental Status (Facility reports brief loc and low blood presssure)   and is admitted to the hospital for the 
Patient alert to self. Ride arrived via stretcher. Report given. Personal belonging listed given with patient including the eyedrops. Gown changed. IV out with clean dry dressing. Tele box in the med room. Stable condition upon discharge.   
Report given to oJsé Manuel. No further questions after the report.   
No    AM-PAC  AM-PAC Basic Mobility - Inpatient   How much help is needed turning from your back to your side while in a flat bed without using bedrails?: A Lot  How much help is needed moving from lying on your back to sitting on the side of a flat bed without using bedrails?: Total  How much help is needed moving to and from a bed to a chair?: Total  How much help is needed standing up from a chair using your arms?: Total  How much help is needed walking in hospital room?: Total  How much help is needed climbing 3-5 steps with a railing?: Total  AM-PAC Inpatient Mobility Raw Score : 7  AM-PAC Inpatient T-Scale Score : 26.42  Mobility Inpatient CMS 0-100% Score: 92.36  Mobility Inpatient CMS G-Code Modifier : CM    Restrictions/Precautions  Restrictions/Precautions  Restrictions/Precautions: General Precautions, Fall Risk, Bed Alarm  Activity Level: Up with Assist  Required Braces or Orthoses?: No  Position Activity Restriction  Other Position/Activity Restrictions: Dementia       Subjective  General  Patient assessed for rehabilitation services?: Yes  Family/Caregiver Present: No  Referring Practitioner: Markus  Referral Date : 02/25/25  Diagnosis: Syncope  Follows Commands: Within Functional Limits  Subjective  Subjective: Pt supine in bed and RN and pt agreeable to therapy. Pt denies pain at this time.       Home Setup/Prior Level of Function  Social/Functional History  Lives With: Other (Comment)  Type of Home: Facility  Home Layout: One level  Home Access: Level entry  Home Equipment: Wheelchair - Manual (Per hx pt was found unconsious in w/c)  Has the patient had two or more falls in the past year or any fall with injury in the past year?: Unknown  Receives Help From: Other (Comment) (Facility staff)  Prior Level of Assist for ADLs: Needs assistance (Pt reports IND all ADLs however likely requires assist for most ADLs/minimum of SUP)  Toileting: Needs assistance  Homemaking Responsibilities: No  Prior Level of 
back to your side while in a flat bed without using bedrails?: Total  How much help is needed moving from lying on your back to sitting on the side of a flat bed without using bedrails?: Total  How much help is needed moving to and from a bed to a chair?: Total  How much help is needed standing up from a chair using your arms?: Total  How much help is needed walking in hospital room?: Total  How much help is needed climbing 3-5 steps with a railing?: Total  AM-PAC Inpatient Mobility Raw Score : 6  AM-PAC Inpatient T-Scale Score : 23.55  Mobility Inpatient CMS 0-100% Score: 100  Mobility Inpatient CMS G-Code Modifier : CN    Restrictions/Precautions  Restrictions/Precautions  Restrictions/Precautions: General Precautions;Fall Risk;Bed Alarm  Activity Level: Up with Assist  Required Braces or Orthoses?: No  Position Activity Restriction  Other Position/Activity Restrictions: Dementia, flat affect       Subjective  General  Patient assessed for rehabilitation services?: Yes  Follows Commands: Impaired  Other (Comment): followed ~20% of one step simple commands  Subjective  Subjective: Pt denies pain       Objective  Orientation  Overall Orientation Status: Impaired  Orientation Level: Oriented to person;Disoriented to place;Disoriented to situation  Cognition  Overall Cognitive Status: Exceptions  Arousal/Alertness: Appears intact  Following Commands: Follows one step commands with repetition;Follows one step commands with increased time (pt followed simple one step commands ~20% of times)  Attention Span: Appears intact  Memory: Impaired;Decreased long term memory;Decreased short term memory;Decreased recall of recent events  Safety Judgement: Decreased awareness of need for assistance;Decreased awareness of need for safety;Impaired  Problem Solving: Impaired;Assistance required to implement solutions;Assistance required to correct errors made;Assistance required to generate solutions;Assistance required to identify 
completed, if low risk no further work up   Theophylline level in process   Will add compression stockings, avoid dehydration  Keep K >4 and Mg >2      Electronically signed by ROMAN Mott NP on 2/27/2025 at 8:27 AM  [facility]  [phone]          
sulfate, ondansetron **OR** ondansetron, polyethylene glycol, acetaminophen **OR** acetaminophen, sulfur hexafluoride microspheres    Data:     Past Medical History:   has a past medical history of Dementia (HCC), Heart failure (HCC), Hyperlipidemia, Hypertension, Muscle wasting and atrophy, not elsewhere classified, multiple sites, and Osteoarthritis.    Social History:   reports that she has never smoked. She has never used smokeless tobacco. She reports that she does not use drugs.     Family History: No family history on file.    Vitals:  BP (!) 169/79   Pulse 69   Temp 98.1 °F (36.7 °C) (Oral)   Resp 16   Ht 1.676 m (5' 6\")   Wt 65.8 kg (145 lb)   SpO2 98%   BMI 23.40 kg/m²   Temp (24hrs), Av.6 °F (36.4 °C), Min:97 °F (36.1 °C), Max:98.1 °F (36.7 °C)    No results for input(s): \"POCGLU\" in the last 72 hours.    I/O (24Hr):    Intake/Output Summary (Last 24 hours) at 3/2/2025 0743  Last data filed at 3/1/2025 2134  Gross per 24 hour   Intake 150 ml   Output 575 ml   Net -425 ml       Labs:  Hematology:  No results for input(s): \"WBC\", \"RBC\", \"HGB\", \"HCT\", \"MCV\", \"MCH\", \"MCHC\", \"RDW\", \"PLT\", \"MPV\", \"SEDRATE\", \"CRP\", \"INR\", \"DDIMER\", \"HP7PZVBF\", \"LABABSO\" in the last 72 hours.    Invalid input(s): \"PT\"    Chemistry:  Recent Labs     25  0858      K 3.8      CO2 26   GLUCOSE 85   BUN 6*   CREATININE 0.8   ANIONGAP 9   LABGLOM 74   CALCIUM 9.7     No results for input(s): \"LABALBU\", \"LABA1C\", \"M1HVPIL\", \"FT4\", \"TSH\", \"AST\", \"ALT\", \"LDH\", \"GGT\", \"ALKPHOS\", \"BILITOT\", \"BILIDIR\", \"AMMONIA\", \"AMYLASE\", \"LIPASE\", \"LACTATE\", \"CHOL\", \"HDL\", \"CHOLHDLRATIO\", \"TRIG\", \"VLDL\", \"HAX37AI\", \"PHENYTOIN\", \"PHENYF\", \"URICACID\", \"POCGLU\" in the last 72 hours.    Invalid input(s): \"PROT\", \"E3AMPTX\", \"LABGGT\", \"LDLCHOLESTEROL\"    ABG:  Lab Results   Component Value Date/Time    PH 5.0 2019 03:59 PM     Lab Results   Component Value Date/Time    SPECIAL  11:00 PM     Lab Results 
  Component Value Date/Time    CULTURE NO SIGNIFICANT GROWTH 07/20/2021 11:00 PM       Radiology:  CT HEAD WO CONTRAST    Result Date: 2/25/2025  No acute intracranial abnormality.     XR CHEST PORTABLE    Result Date: 2/25/2025  1. No acute cardiopulmonary process. 2. Vague small opacity, possibly nodular, left upper lung. Recommend follow-up chest x-ray in 3 months.     FL modified barium swallow    Result Date: 2/25/2025  Impression: 1. No aspiration ** See Speech Pathology report for more details and recommendations. Electronically signed: Mike Marcum.      Physical Examination:       General appearance:  alert, cooperative and no distress  HEENT:  NC/AT, PERRLA, EOMI, clear left conjunctiva nose clear  Mental Status:  oriented to person only, flat affect  Lungs:  clear to auscultation bilaterally, normal effort  Heart:  regular rate and rhythm, + 2/6 murmur  Abdomen:  soft, nontender, nondistended, normal bowel sounds, no masses, hepatomegaly, splenomegaly  Extremities:  no edema, redness, tenderness in the calves  Skin:  no gross lesions, rashes, induration    Assessment:     Hospital Problems             Last Modified POA    * (Principal) Syncope, unspecified syncope type 2/25/2025 Yes    Hyperlipidemia 2/25/2025 Yes    Hypertension 2/25/2025 Yes    Bradycardia 2/26/2025 Yes    Dementia (HCC) 2/25/2025 Yes    Chronic diastolic heart failure (HCC) 2/26/2025 Yes       Plan:     Syncope-   Echo reviewed,   orthostatic negative after IVF, con't telemetry, appreciate Cardiology eval  HTN-   Aldactone, and Entresto resumed, BP improved on Hydralazine 25mg TID  Dementia- on Aricept, and Depakote  Hold Lasix  Unclear why she's on Theophylline, hold since level is slightly elevated   UA negtive  Hypokalemia- resolved  Left eye conjunctivitis- finish CiproDex eye gtts BID x 5 more days  DVT proph  PT/OT    Plan for discharge since guardian approved discharge    Rosamaria Verdin MD  3/3/2025  1:52 PM            
Strategies  Education Provided Comments: activity engagement, cognition/attention to task, activity tolerance, ADLs  Education Method: Verbal;Demonstration  Barriers to Learning: Cognition  Education Outcome: Continued education needed    Goals  Short Term Goals  Time Frame for Short Term Goals: 14 visits  Short Term Goal 1: Pt to complete all ADL transfers at CGA with LRAD and good safety awareness  Short Term Goal 2: Pt to complete LB ADLs/toileting at MIN A with no vc's for safety  Short Term Goal 3: Pt to complete UB ADLs at setup while seated with back support  Short Term Goal 4: Pt to complete standing ADL for >6 mins at CGA with no LOB and no rest break  Short Term Goal 5: Pt to be IND with BUE HEP to support improved functional strength and endurance needed for ADLs and ADL transfers    Plan  Occupational Therapy Plan  Times Per Week: 5-6x/wk  Times Per Day: Once a day  Current Treatment Recommendations: Strengthening, ROM, Balance training, Functional mobility training, Endurance training, Safety education & training, Patient/Caregiver education & training, Equipment evaluation, education, & procurement, Self-Care / ADL, Pain management, Modalities, Positioning, Coordination training    Minutes  OT Individual Minutes  Time In: 1343  Time Out: 1416  Minutes: 33  Time Code Minutes   Timed Code Treatment Minutes: 23 Minutes    Co- treatment with PT warranted secondary to decreased patient safety and independence with functional mobility requiring skilled physical assistance of two professionals to simultaneously address individualized discipline goals. OT is addressing ADL transfers/activity tolerance/ADL participation, while PT is addressing their individualized functional mobility task.    Electronically signed by ELIGIO Flowers on 2/28/25 at 4:01 PM EST    
Training  Education Provided Comments: safety with mobility, importance of activity  Education Method: Verbal;Demonstration  Barriers to Learning: Cognition  Education Outcome: Continued education needed    Plan  Physical Therapy Plan  General Plan: 3-5 times per week  Current Treatment Recommendations: Strengthening, Balance training, Functional mobility training, Transfer training, Gait training, Safety education & training, Patient/Caregiver education & training, Therapeutic activities    Goals  Patient Goals   Patient Goals : None stated  Short Term Goals  Time Frame for Short Term Goals: 14 visits  Short Term Goal 1: Min assist bed mobility.  Short Term Goal 2: Pt to tolerate 30 minutes of therapy activity to improve strength for mobility.  Short Term Goal 3: Pt to transfer CGA with walker and no LOB.  Short Term Goal 4: Pt to ambulate with walker 30' Min-CGA without LOB.    Minutes  PT Individual Minutes  Time In: 1344  Time Out: 1416  Minutes: 32  Time Code Minutes  Timed Code Treatment Minutes: 23 Minutes  Co- treatment with OT warranted secondary to decreased patient safety and independence with functional mobility requiring skilled physical assistance of two professionals to simultaneously address individualized discipline goals. PT is addressing transfers and gait , while OT is addressing their individualized functional mobility/self-care task.     Electronically signed by Cindy Garcia, PT on 2/28/25 at 3:01 PM EST     
small opacity, possibly nodular, left upper lung. Recommend follow-up chest x-ray in 3 months.     FL modified barium swallow    Result Date: 2/25/2025  Impression: 1. No aspiration ** See Speech Pathology report for more details and recommendations. Electronically signed: Mike Marcum.      Physical Examination:       General appearance:  alert, cooperative and no distress  Mental Status:  oriented to person only, flat affect  Lungs:  clear to auscultation bilaterally, normal effort  Heart:  regular rate and rhythm, + 2/6 murmur  Abdomen:  soft, nontender, nondistended, normal bowel sounds, no masses, hepatomegaly, splenomegaly  Extremities:  no edema, redness, tenderness in the calves  Skin:  no gross lesions, rashes, induration    Assessment:     Hospital Problems             Last Modified POA    * (Principal) Syncope, unspecified syncope type 2/25/2025 Yes    Hyperlipidemia 2/25/2025 Yes    Hypertension 2/25/2025 Yes    Bradycardia 2/26/2025 Yes    Dementia (HCC) 2/25/2025 Yes    Chronic diastolic heart failure (HCC) 2/26/2025 Yes       Plan:     Syncope- await Echo, check orthostatic BP, con't telemetry, appreciate Cardiology eval  HTN- holding Aldactone, and Entresto, monitor BP  Dementia- on Aricept, and Depakote  Hold Lasix  Unclear why she's on Theophylline  Check a UA/ UC  DVT proph  PT/OT    Rosamaria Verdin MD  2/26/2025  7:47 AM    
skilled physical assistance of two professionals to simultaneously address individualized discipline goals. OT is addressing activity engagement, cognition/attention to task/initiation of task, ADLs, safety and functional transfers/mobility as precursor to ADL completion , while PT is addressing their individualized functional mobility task.     Electronically signed by TEE SWANSON OTR/L on 2/27/25 at 1:48 PM EST   
Self-Care / ADL, Pain management, Modalities, Positioning, Coordination training    Minutes  OT Individual Minutes  Time In: 0841  Time Out: 0921  Minutes: 40  Time Code Minutes   Timed Code Treatment Minutes: 30 Minutes    Co- treatment with PT warranted secondary to decreased patient safety and independence with functional mobility requiring skilled physical assistance of two professionals to simultaneously address individualized discipline goals. OT is addressing ADL transfers/activity tolerance/functional mobility/ADL performance, while PT is addressing their individualized functional mobility task.    Electronically signed by ELIGIO Flowers on 2/26/25 at 9:50 AM EST

## 2025-03-03 NOTE — CARE COORDINATION
Discharge Report    [facility]  Clinical Case Management Department  Written by: Ching Adam    Patient Name: Jillian Britton  Attending Provider: Rosamaria Verdin MD  Admit Date: 2025  7:00 PM  MRN: [id number]  Account: [id number]                     : 1945  Discharge Date: 3/3/25      Disposition: long term care facility    Patient discharging back to [facility] today. Nannette Palencia, patients legal guardian contacted and agrees with discharge plans. Transport request sent to [facility].     1438: Transport set for 4pm    Nursing to call report to [phone]    Ching Adam     
[facility] Quality Flow/Interdisciplinary Rounds Progress Note    Quality Flow Rounds held on February 28, 2025 at 0930    Disciplines Attending:  Bedside Nurse, , and Nursing Unit Leadership    Barriers to Discharge: Clinical status    Anticipated Discharge Date:   3/1/25    Anticipated Discharge Disposition: ECF- [facility] RISK OF UNPLANNED READMISSION 2.0             14.7 Total Score        Discussed patient goal for the day, patient clinical progression, and barriers to discharge.  RN reports that cardiology plans to restart Entresto today and monitor patient. Possible discharge tomorrow. Patient is a bed hold at [facility] and is able to return over the weekend.      Carissa Stevens RN  February 28, 2025  
RN  Case Management Department

## 2025-03-03 NOTE — DISCHARGE SUMMARY
[facility]  Office: [phone]  Zaid Sauceda DO, Bernardo Faulkner DO, Nick De La Paz DO, Fercho Brock DO, Linda Salvador MD, Rosamaria Verdin MD, Teena Puckett MD, Nga Russell MD,  Pradip Ely MD, Yvrose Elena MD, Dvei Brink MD,  Olivia Hale DO, Robbin Nuñez MD, Brayden Gill MD, Calixto Sauceda DO, Jocelyne Sandoval MD,  Antione Mckenna DO, Grisel Tiwari MD, Joaquina Hughes MD, Ambreen Ernandez MD, Morgan Hernandez MD,  Shaka Stone MD, Shannan Arshad MD, Kim Cheung MD, Junito Hendrix MD, Mihir Francis MD, Hetal Villarreal MD, Jeovanny James DO, Selvin Keating MD, Olivia Perry MD, Mohsin Reza, MD, Shirley Waterhouse, CNP,  Bree Mueller CNP, Jeovanny Lepe, CNP,  Madina Hidalgo, [facility], Shira Lipscomb, CNP, Cristina Graves, CNP, Mary Bahena, CNP, Laura Puente CNP, RANJEET Womack-HARRIET, Lexy Yuen, CNP, Daniel Spaulding, CNP,  [place], CNP, Chrissy Aparicio, CNP, Zo Bolaños, CNP,  Josy Glass, CNS, Jocelin Hemphill CNP, Irlanda Corcoran CNP,   Lizbeth Romero, CNP         [facility]   IN-PATIENT SERVICE   [facility]    Discharge Summary     Patient ID: Jillian Britton  :  1945   MRN: [id number]     ACCOUNT:  [id number]   Patient's PCP: Alexi Montes MD  Admit Date: 2025   Discharge Date: 3/3/2025    Length of Stay: 6  Code Status:  Full Code  Admitting Physician: No admitting provider for patient encounter.  Discharge Physician: Rosamaria Verdin MD     Active Discharge Diagnoses:     Hospital Problem Lists:  Principal Problem:    Syncope, unspecified syncope type  Active Problems:    Hyperlipidemia    Hypertension    Bradycardia    Dementia (HCC)    Chronic diastolic heart failure (HCC)  Resolved Problems:    * No resolved hospital problems. *      Admission Condition:  poor     Discharged Condition: fair    Hospital Stay:     Hospital Course:  Jillian Britton is a 79 y.o. female who was admitted for the management of  Syncope,

## 2025-03-03 NOTE — PLAN OF CARE
Problem: Safety - Adult  Goal: Free from fall injury  3/3/2025 1011 by Julianna Obregon RN  Outcome: Progressing  3/3/2025 0239 by Marisela Martinez LPN  Outcome: Progressing     Problem: Discharge Planning  Goal: Discharge to home or other facility with appropriate resources  3/3/2025 1011 by Julianna Obregon RN  Outcome: Progressing  3/3/2025 0239 by Marisela Martinez LPN  Outcome: Progressing     Problem: ABCDS Injury Assessment  Goal: Absence of physical injury  Outcome: Progressing     Problem: Skin/Tissue Integrity  Goal: Skin integrity remains intact  Description: 1.  Monitor for areas of redness and/or skin breakdown  2.  Assess vascular access sites hourly  3.  Every 4-6 hours minimum:  Change oxygen saturation probe site  4.  Every 4-6 hours:  If on nasal continuous positive airway pressure, respiratory therapy assess nares and determine need for appliance change or resting period  Outcome: Progressing     Problem: Pain  Goal: Verbalizes/displays adequate comfort level or baseline comfort level  3/3/2025 1011 by Julianna Obregon RN  Outcome: Progressing  3/3/2025 0239 by Marisela Martinez LPN  Outcome: Progressing